# Patient Record
Sex: FEMALE | Race: ASIAN | NOT HISPANIC OR LATINO
[De-identification: names, ages, dates, MRNs, and addresses within clinical notes are randomized per-mention and may not be internally consistent; named-entity substitution may affect disease eponyms.]

---

## 2017-01-06 ENCOUNTER — APPOINTMENT (OUTPATIENT)
Dept: PEDIATRIC CARDIOLOGY | Facility: CLINIC | Age: 22
End: 2017-01-06

## 2017-02-02 ENCOUNTER — APPOINTMENT (OUTPATIENT)
Dept: ANTEPARTUM | Facility: CLINIC | Age: 22
End: 2017-02-02

## 2017-02-02 ENCOUNTER — ASOB RESULT (OUTPATIENT)
Age: 22
End: 2017-02-02

## 2017-03-23 ENCOUNTER — OUTPATIENT (OUTPATIENT)
Dept: OUTPATIENT SERVICES | Facility: HOSPITAL | Age: 22
LOS: 1 days | End: 2017-03-23
Payer: COMMERCIAL

## 2017-03-23 DIAGNOSIS — Z3A.00 WEEKS OF GESTATION OF PREGNANCY NOT SPECIFIED: ICD-10-CM

## 2017-03-23 DIAGNOSIS — O26.899 OTHER SPECIFIED PREGNANCY RELATED CONDITIONS, UNSPECIFIED TRIMESTER: ICD-10-CM

## 2017-03-23 PROCEDURE — 99213 OFFICE O/P EST LOW 20 MIN: CPT

## 2017-03-29 ENCOUNTER — OUTPATIENT (OUTPATIENT)
Dept: OUTPATIENT SERVICES | Facility: HOSPITAL | Age: 22
LOS: 1 days | End: 2017-03-29

## 2017-03-29 DIAGNOSIS — O26.899 OTHER SPECIFIED PREGNANCY RELATED CONDITIONS, UNSPECIFIED TRIMESTER: ICD-10-CM

## 2017-03-30 ENCOUNTER — INPATIENT (INPATIENT)
Facility: HOSPITAL | Age: 22
LOS: 1 days | Discharge: HOME | End: 2017-04-01
Attending: OBSTETRICS & GYNECOLOGY | Admitting: OBSTETRICS & GYNECOLOGY

## 2017-03-30 VITALS — WEIGHT: 152.12 LBS | HEIGHT: 58 IN

## 2017-03-30 DIAGNOSIS — Z3A.00 WEEKS OF GESTATION OF PREGNANCY NOT SPECIFIED: ICD-10-CM

## 2017-03-30 DIAGNOSIS — O26.899 OTHER SPECIFIED PREGNANCY RELATED CONDITIONS, UNSPECIFIED TRIMESTER: ICD-10-CM

## 2017-03-30 LAB
BASOPHILS # BLD AUTO: 0.02 K/UL — SIGNIFICANT CHANGE UP (ref 0–0.2)
BASOPHILS NFR BLD AUTO: 0.2 % — SIGNIFICANT CHANGE UP (ref 0–2)
BLD GP AB SCN SERPL QL: NEGATIVE — SIGNIFICANT CHANGE UP
EOSINOPHIL # BLD AUTO: 0.06 K/UL — SIGNIFICANT CHANGE UP (ref 0–0.5)
EOSINOPHIL NFR BLD AUTO: 0.5 % — SIGNIFICANT CHANGE UP (ref 0–6)
HCT VFR BLD CALC: 34.7 % — SIGNIFICANT CHANGE UP (ref 34.5–45)
HGB BLD-MCNC: 10 G/DL — LOW (ref 11.5–15.5)
IMM GRANULOCYTES NFR BLD AUTO: 0.3 % — SIGNIFICANT CHANGE UP (ref 0–1.5)
LYMPHOCYTES # BLD AUTO: 28.1 % — SIGNIFICANT CHANGE UP (ref 13–44)
LYMPHOCYTES # BLD AUTO: 3.37 K/UL — HIGH (ref 1–3.3)
MCHC RBC-ENTMCNC: 21.2 PG — LOW (ref 27–34)
MCHC RBC-ENTMCNC: 28.8 % — LOW (ref 32–36)
MCV RBC AUTO: 73.7 FL — LOW (ref 80–100)
MONOCYTES # BLD AUTO: 0.69 K/UL — SIGNIFICANT CHANGE UP (ref 0–0.9)
MONOCYTES NFR BLD AUTO: 5.8 % — SIGNIFICANT CHANGE UP (ref 2–14)
NEUTROPHILS # BLD AUTO: 7.8 K/UL — HIGH (ref 1.8–7.4)
NEUTROPHILS NFR BLD AUTO: 65.1 % — SIGNIFICANT CHANGE UP (ref 43–77)
PLATELET # BLD AUTO: 320 K/UL — SIGNIFICANT CHANGE UP (ref 150–400)
PMV BLD: 9.3 FL — SIGNIFICANT CHANGE UP (ref 7–13)
RBC # BLD: 4.71 M/UL — SIGNIFICANT CHANGE UP (ref 3.8–5.2)
RBC # FLD: SIGNIFICANT CHANGE UP % (ref 10.3–14.5)
RH IG SCN BLD-IMP: POSITIVE — SIGNIFICANT CHANGE UP
WBC # BLD: 11.98 K/UL — HIGH (ref 3.8–10.5)
WBC # FLD AUTO: 11.98 K/UL — HIGH (ref 3.8–10.5)

## 2017-03-30 RX ORDER — INFLUENZA VIRUS VACCINE 15; 15; 15; 15 UG/.5ML; UG/.5ML; UG/.5ML; UG/.5ML
0.5 SUSPENSION INTRAMUSCULAR ONCE
Qty: 0 | Refills: 0 | Status: DISCONTINUED | OUTPATIENT
Start: 2017-03-30 | End: 2017-04-01

## 2017-03-30 RX ORDER — DIBUCAINE 1 %
1 OINTMENT (GRAM) RECTAL EVERY 4 HOURS
Qty: 0 | Refills: 0 | Status: DISCONTINUED | OUTPATIENT
Start: 2017-03-30 | End: 2017-04-01

## 2017-03-30 RX ORDER — OXYTOCIN 10 UNIT/ML
41.67 VIAL (ML) INJECTION
Qty: 20 | Refills: 0 | Status: DISCONTINUED | OUTPATIENT
Start: 2017-03-30 | End: 2017-03-31

## 2017-03-30 RX ORDER — GLYCERIN ADULT
1 SUPPOSITORY, RECTAL RECTAL AT BEDTIME
Qty: 0 | Refills: 0 | Status: DISCONTINUED | OUTPATIENT
Start: 2017-03-30 | End: 2017-04-01

## 2017-03-30 RX ORDER — PRAMOXINE HYDROCHLORIDE 150 MG/15G
1 AEROSOL, FOAM RECTAL EVERY 4 HOURS
Qty: 0 | Refills: 0 | Status: DISCONTINUED | OUTPATIENT
Start: 2017-03-30 | End: 2017-03-30

## 2017-03-30 RX ORDER — HYDROCORTISONE 1 %
1 OINTMENT (GRAM) TOPICAL EVERY 4 HOURS
Qty: 0 | Refills: 0 | Status: DISCONTINUED | OUTPATIENT
Start: 2017-03-30 | End: 2017-03-31

## 2017-03-30 RX ORDER — DIBUCAINE 1 %
1 OINTMENT (GRAM) RECTAL EVERY 4 HOURS
Qty: 0 | Refills: 0 | Status: DISCONTINUED | OUTPATIENT
Start: 2017-03-30 | End: 2017-03-30

## 2017-03-30 RX ORDER — SODIUM CHLORIDE 9 MG/ML
3 INJECTION INTRAMUSCULAR; INTRAVENOUS; SUBCUTANEOUS EVERY 8 HOURS
Qty: 0 | Refills: 0 | Status: DISCONTINUED | OUTPATIENT
Start: 2017-03-30 | End: 2017-03-30

## 2017-03-30 RX ORDER — OXYTOCIN 10 UNIT/ML
333.33 VIAL (ML) INJECTION
Qty: 20 | Refills: 0 | Status: DISCONTINUED | OUTPATIENT
Start: 2017-03-30 | End: 2017-03-31

## 2017-03-30 RX ORDER — SODIUM CHLORIDE 9 MG/ML
3 INJECTION INTRAMUSCULAR; INTRAVENOUS; SUBCUTANEOUS EVERY 8 HOURS
Qty: 0 | Refills: 0 | Status: DISCONTINUED | OUTPATIENT
Start: 2017-03-30 | End: 2017-03-31

## 2017-03-30 RX ORDER — LANOLIN
1 OINTMENT (GRAM) TOPICAL EVERY 6 HOURS
Qty: 0 | Refills: 0 | Status: DISCONTINUED | OUTPATIENT
Start: 2017-03-30 | End: 2017-04-01

## 2017-03-30 RX ORDER — OXYCODONE HYDROCHLORIDE 5 MG/1
5 TABLET ORAL
Qty: 0 | Refills: 0 | Status: DISCONTINUED | OUTPATIENT
Start: 2017-03-30 | End: 2017-04-01

## 2017-03-30 RX ORDER — OXYTOCIN 10 UNIT/ML
333.33 VIAL (ML) INJECTION
Qty: 20 | Refills: 0 | Status: COMPLETED | OUTPATIENT
Start: 2017-03-30

## 2017-03-30 RX ORDER — SODIUM CHLORIDE 9 MG/ML
1000 INJECTION, SOLUTION INTRAVENOUS ONCE
Qty: 0 | Refills: 0 | Status: COMPLETED | OUTPATIENT
Start: 2017-03-30 | End: 2017-03-30

## 2017-03-30 RX ORDER — HYDROCORTISONE 1 %
1 OINTMENT (GRAM) TOPICAL EVERY 4 HOURS
Qty: 0 | Refills: 0 | Status: DISCONTINUED | OUTPATIENT
Start: 2017-03-30 | End: 2017-03-30

## 2017-03-30 RX ORDER — DOCUSATE SODIUM 100 MG
100 CAPSULE ORAL
Qty: 0 | Refills: 0 | Status: DISCONTINUED | OUTPATIENT
Start: 2017-03-30 | End: 2017-04-01

## 2017-03-30 RX ORDER — KETOROLAC TROMETHAMINE 30 MG/ML
30 SYRINGE (ML) INJECTION ONCE
Qty: 0 | Refills: 0 | Status: DISCONTINUED | OUTPATIENT
Start: 2017-03-30 | End: 2017-03-30

## 2017-03-30 RX ORDER — ACETAMINOPHEN 500 MG
975 TABLET ORAL EVERY 6 HOURS
Qty: 0 | Refills: 0 | Status: DISCONTINUED | OUTPATIENT
Start: 2017-03-30 | End: 2017-04-01

## 2017-03-30 RX ORDER — PRAMOXINE HYDROCHLORIDE 150 MG/15G
1 AEROSOL, FOAM RECTAL EVERY 4 HOURS
Qty: 0 | Refills: 0 | Status: DISCONTINUED | OUTPATIENT
Start: 2017-03-30 | End: 2017-03-31

## 2017-03-30 RX ORDER — AER TRAVELER 0.5 G/1
1 SOLUTION RECTAL; TOPICAL EVERY 4 HOURS
Qty: 0 | Refills: 0 | Status: DISCONTINUED | OUTPATIENT
Start: 2017-03-30 | End: 2017-04-01

## 2017-03-30 RX ORDER — OXYTOCIN 10 UNIT/ML
41.67 VIAL (ML) INJECTION
Qty: 20 | Refills: 0 | Status: DISCONTINUED | OUTPATIENT
Start: 2017-03-30 | End: 2017-03-30

## 2017-03-30 RX ORDER — MAGNESIUM HYDROXIDE 400 MG/1
30 TABLET, CHEWABLE ORAL
Qty: 0 | Refills: 0 | Status: DISCONTINUED | OUTPATIENT
Start: 2017-03-30 | End: 2017-04-01

## 2017-03-30 RX ORDER — DIPHENHYDRAMINE HCL 50 MG
25 CAPSULE ORAL EVERY 6 HOURS
Qty: 0 | Refills: 0 | Status: DISCONTINUED | OUTPATIENT
Start: 2017-03-30 | End: 2017-04-01

## 2017-03-30 RX ORDER — AER TRAVELER 0.5 G/1
1 SOLUTION RECTAL; TOPICAL EVERY 4 HOURS
Qty: 0 | Refills: 0 | Status: DISCONTINUED | OUTPATIENT
Start: 2017-03-30 | End: 2017-03-30

## 2017-03-30 RX ORDER — OXYCODONE HYDROCHLORIDE 5 MG/1
5 TABLET ORAL EVERY 4 HOURS
Qty: 0 | Refills: 0 | Status: DISCONTINUED | OUTPATIENT
Start: 2017-03-30 | End: 2017-04-01

## 2017-03-30 RX ORDER — IBUPROFEN 200 MG
600 TABLET ORAL EVERY 6 HOURS
Qty: 0 | Refills: 0 | Status: DISCONTINUED | OUTPATIENT
Start: 2017-03-30 | End: 2017-04-01

## 2017-03-30 RX ORDER — TETANUS TOXOID, REDUCED DIPHTHERIA TOXOID AND ACELLULAR PERTUSSIS VACCINE, ADSORBED 5; 2.5; 8; 8; 2.5 [IU]/.5ML; [IU]/.5ML; UG/.5ML; UG/.5ML; UG/.5ML
0.5 SUSPENSION INTRAMUSCULAR ONCE
Qty: 0 | Refills: 0 | Status: COMPLETED | OUTPATIENT
Start: 2017-03-30

## 2017-03-30 RX ORDER — SODIUM CHLORIDE 9 MG/ML
1000 INJECTION, SOLUTION INTRAVENOUS
Qty: 0 | Refills: 0 | Status: DISCONTINUED | OUTPATIENT
Start: 2017-03-30 | End: 2017-03-30

## 2017-03-30 RX ORDER — SIMETHICONE 80 MG/1
80 TABLET, CHEWABLE ORAL EVERY 6 HOURS
Qty: 0 | Refills: 0 | Status: DISCONTINUED | OUTPATIENT
Start: 2017-03-30 | End: 2017-04-01

## 2017-03-30 RX ADMIN — Medication 30 MILLIGRAM(S): at 20:15

## 2017-03-30 RX ADMIN — SODIUM CHLORIDE 3 MILLILITER(S): 9 INJECTION INTRAMUSCULAR; INTRAVENOUS; SUBCUTANEOUS at 23:00

## 2017-03-30 RX ADMIN — SODIUM CHLORIDE 2000 MILLILITER(S): 9 INJECTION, SOLUTION INTRAVENOUS at 16:50

## 2017-03-30 RX ADMIN — Medication 1 APPLICATION(S): at 23:23

## 2017-03-30 RX ADMIN — Medication 30 MILLIGRAM(S): at 20:30

## 2017-03-30 RX ADMIN — Medication 125 MILLIUNIT(S)/MIN: at 20:17

## 2017-03-30 RX ADMIN — PRAMOXINE HYDROCHLORIDE 1 APPLICATION(S): 150 AEROSOL, FOAM RECTAL at 23:23

## 2017-03-30 RX ADMIN — Medication 1000 MILLIUNIT(S)/MIN: at 20:46

## 2017-03-30 RX ADMIN — AER TRAVELER 1 APPLICATION(S): 0.5 SOLUTION RECTAL; TOPICAL at 23:22

## 2017-03-31 ENCOUNTER — TRANSCRIPTION ENCOUNTER (OUTPATIENT)
Age: 22
End: 2017-03-31

## 2017-03-31 LAB — T PALLIDUM AB TITR SER: NEGATIVE — SIGNIFICANT CHANGE UP

## 2017-03-31 RX ORDER — PRAMOXINE HYDROCHLORIDE 150 MG/15G
1 AEROSOL, FOAM RECTAL EVERY 4 HOURS
Qty: 0 | Refills: 0 | Status: DISCONTINUED | OUTPATIENT
Start: 2017-03-31 | End: 2017-04-01

## 2017-03-31 RX ORDER — HYDROCORTISONE 1 %
1 OINTMENT (GRAM) TOPICAL EVERY 4 HOURS
Qty: 0 | Refills: 0 | Status: DISCONTINUED | OUTPATIENT
Start: 2017-03-31 | End: 2017-04-01

## 2017-03-31 RX ORDER — TETANUS TOXOID, REDUCED DIPHTHERIA TOXOID AND ACELLULAR PERTUSSIS VACCINE, ADSORBED 5; 2.5; 8; 8; 2.5 [IU]/.5ML; [IU]/.5ML; UG/.5ML; UG/.5ML; UG/.5ML
0.5 SUSPENSION INTRAMUSCULAR ONCE
Qty: 0 | Refills: 0 | Status: COMPLETED | OUTPATIENT
Start: 2017-03-31 | End: 2017-03-31

## 2017-03-31 RX ADMIN — Medication 600 MILLIGRAM(S): at 05:53

## 2017-03-31 RX ADMIN — Medication 600 MILLIGRAM(S): at 17:17

## 2017-03-31 RX ADMIN — TETANUS TOXOID, REDUCED DIPHTHERIA TOXOID AND ACELLULAR PERTUSSIS VACCINE, ADSORBED 0.5 MILLILITER(S): 5; 2.5; 8; 8; 2.5 SUSPENSION INTRAMUSCULAR at 22:40

## 2017-03-31 RX ADMIN — OXYCODONE HYDROCHLORIDE 5 MILLIGRAM(S): 5 TABLET ORAL at 22:42

## 2017-03-31 RX ADMIN — OXYCODONE HYDROCHLORIDE 5 MILLIGRAM(S): 5 TABLET ORAL at 17:17

## 2017-03-31 RX ADMIN — Medication 975 MILLIGRAM(S): at 12:19

## 2017-03-31 RX ADMIN — OXYCODONE HYDROCHLORIDE 5 MILLIGRAM(S): 5 TABLET ORAL at 23:15

## 2017-03-31 RX ADMIN — Medication 600 MILLIGRAM(S): at 23:15

## 2017-03-31 RX ADMIN — Medication 600 MILLIGRAM(S): at 12:19

## 2017-03-31 RX ADMIN — Medication 975 MILLIGRAM(S): at 18:00

## 2017-03-31 RX ADMIN — Medication 1 TABLET(S): at 12:19

## 2017-03-31 RX ADMIN — Medication 975 MILLIGRAM(S): at 23:15

## 2017-03-31 RX ADMIN — Medication 975 MILLIGRAM(S): at 05:00

## 2017-03-31 RX ADMIN — Medication 975 MILLIGRAM(S): at 22:42

## 2017-03-31 RX ADMIN — Medication 600 MILLIGRAM(S): at 06:16

## 2017-03-31 RX ADMIN — PRAMOXINE HYDROCHLORIDE 1 APPLICATION(S): 150 AEROSOL, FOAM RECTAL at 22:43

## 2017-03-31 RX ADMIN — Medication 975 MILLIGRAM(S): at 17:18

## 2017-03-31 RX ADMIN — Medication 600 MILLIGRAM(S): at 22:42

## 2017-03-31 RX ADMIN — Medication 1 APPLICATION(S): at 22:43

## 2017-03-31 RX ADMIN — AER TRAVELER 1 APPLICATION(S): 0.5 SOLUTION RECTAL; TOPICAL at 22:42

## 2017-03-31 RX ADMIN — Medication 600 MILLIGRAM(S): at 18:00

## 2017-03-31 RX ADMIN — Medication 600 MILLIGRAM(S): at 12:49

## 2017-03-31 RX ADMIN — OXYCODONE HYDROCHLORIDE 5 MILLIGRAM(S): 5 TABLET ORAL at 18:00

## 2017-03-31 RX ADMIN — Medication 975 MILLIGRAM(S): at 04:02

## 2017-03-31 RX ADMIN — OXYCODONE HYDROCHLORIDE 5 MILLIGRAM(S): 5 TABLET ORAL at 08:53

## 2017-03-31 RX ADMIN — Medication 100 MILLIGRAM(S): at 05:53

## 2017-03-31 RX ADMIN — Medication 975 MILLIGRAM(S): at 12:49

## 2017-03-31 RX ADMIN — OXYCODONE HYDROCHLORIDE 5 MILLIGRAM(S): 5 TABLET ORAL at 09:30

## 2017-03-31 RX ADMIN — SODIUM CHLORIDE 3 MILLILITER(S): 9 INJECTION INTRAMUSCULAR; INTRAVENOUS; SUBCUTANEOUS at 05:52

## 2017-03-31 RX ADMIN — Medication 1 APPLICATION(S): at 22:42

## 2017-03-31 NOTE — DISCHARGE NOTE OB - CARE PROVIDER_API CALL
Ani Mccann  [address] [place] [address]  [facility] [address]  Phone: [phone]  Fax: [phone]

## 2017-03-31 NOTE — DISCHARGE NOTE OB - CARE PLAN
Principal Discharge DX:	Vaginal delivery  Goal:	wellness  Instructions for follow-up, activity and diet:	Same as the last day in the hospital.

## 2017-03-31 NOTE — DISCHARGE NOTE OB - MEDICATION SUMMARY - MEDICATIONS TO STOP TAKING
I will STOP taking the medications listed below when I get home from the hospital:    Prenatal 1 oral capsule  --  by mouth

## 2017-03-31 NOTE — DISCHARGE NOTE OB - HOSPITAL COURSE
Patient presented in active labor and received an epidural .She progressed rapidly to full dilatation . Viable female infant. vacuum assistance for terminal bradycardia and maternal exhaustion.

## 2017-03-31 NOTE — DISCHARGE NOTE OB - PROVIDER TOKENS
FREE:[LAST:[Ramy],FIRST:[Ani],PHONE:[phone]],FAX:[phone]],ADDRESS:[[address] [place] [address]]]

## 2017-04-01 VITALS
DIASTOLIC BLOOD PRESSURE: 60 MMHG | TEMPERATURE: 98 F | OXYGEN SATURATION: 100 % | SYSTOLIC BLOOD PRESSURE: 112 MMHG | HEART RATE: 64 BPM | RESPIRATION RATE: 18 BRPM

## 2017-04-01 RX ADMIN — OXYCODONE HYDROCHLORIDE 5 MILLIGRAM(S): 5 TABLET ORAL at 06:32

## 2017-04-01 RX ADMIN — Medication 975 MILLIGRAM(S): at 05:55

## 2017-04-01 RX ADMIN — OXYCODONE HYDROCHLORIDE 5 MILLIGRAM(S): 5 TABLET ORAL at 05:55

## 2017-04-01 RX ADMIN — Medication 975 MILLIGRAM(S): at 06:32

## 2017-04-01 RX ADMIN — Medication 600 MILLIGRAM(S): at 06:32

## 2017-04-01 RX ADMIN — Medication 600 MILLIGRAM(S): at 05:55

## 2017-11-18 ENCOUNTER — EMERGENCY (EMERGENCY)
Facility: HOSPITAL | Age: 22
LOS: 1 days | Discharge: HOME | End: 2017-11-18
Attending: EMERGENCY MEDICINE | Admitting: EMERGENCY MEDICINE
Payer: COMMERCIAL

## 2017-11-18 VITALS
RESPIRATION RATE: 18 BRPM | WEIGHT: 136.03 LBS | SYSTOLIC BLOOD PRESSURE: 135 MMHG | OXYGEN SATURATION: 99 % | DIASTOLIC BLOOD PRESSURE: 92 MMHG | TEMPERATURE: 98 F | HEIGHT: 58 IN | HEART RATE: 88 BPM

## 2017-11-18 LAB
ALBUMIN SERPL ELPH-MCNC: 4.1 G/DL — SIGNIFICANT CHANGE UP (ref 3.3–5)
ALP SERPL-CCNC: 70 U/L — SIGNIFICANT CHANGE UP (ref 40–120)
ALT FLD-CCNC: 15 U/L — SIGNIFICANT CHANGE UP (ref 4–33)
APPEARANCE UR: CLEAR — SIGNIFICANT CHANGE UP
APTT BLD: 29.8 SEC — SIGNIFICANT CHANGE UP (ref 27.5–37.4)
AST SERPL-CCNC: 22 U/L — SIGNIFICANT CHANGE UP (ref 4–32)
BASOPHILS # BLD AUTO: 0.06 K/UL — SIGNIFICANT CHANGE UP (ref 0–0.2)
BASOPHILS NFR BLD AUTO: 0.4 % — SIGNIFICANT CHANGE UP (ref 0–2)
BILIRUB SERPL-MCNC: 0.4 MG/DL — SIGNIFICANT CHANGE UP (ref 0.2–1.2)
BILIRUB UR-MCNC: NEGATIVE — SIGNIFICANT CHANGE UP
BLOOD UR QL VISUAL: NEGATIVE — SIGNIFICANT CHANGE UP
BUN SERPL-MCNC: 11 MG/DL — SIGNIFICANT CHANGE UP (ref 7–23)
CALCIUM SERPL-MCNC: 9.3 MG/DL — SIGNIFICANT CHANGE UP (ref 8.4–10.5)
CHLORIDE SERPL-SCNC: 102 MMOL/L — SIGNIFICANT CHANGE UP (ref 98–107)
CO2 SERPL-SCNC: 20 MMOL/L — LOW (ref 22–31)
COLOR SPEC: SIGNIFICANT CHANGE UP
CREAT SERPL-MCNC: 0.58 MG/DL — SIGNIFICANT CHANGE UP (ref 0.5–1.3)
EOSINOPHIL # BLD AUTO: 0.17 K/UL — SIGNIFICANT CHANGE UP (ref 0–0.5)
EOSINOPHIL NFR BLD AUTO: 1.1 % — SIGNIFICANT CHANGE UP (ref 0–6)
GLUCOSE SERPL-MCNC: 92 MG/DL — SIGNIFICANT CHANGE UP (ref 70–99)
GLUCOSE UR-MCNC: NEGATIVE — SIGNIFICANT CHANGE UP
HCG SERPL-ACNC: SIGNIFICANT CHANGE UP MIU/ML
HCT VFR BLD CALC: 39 % — SIGNIFICANT CHANGE UP (ref 34.5–45)
HGB BLD-MCNC: 12.7 G/DL — SIGNIFICANT CHANGE UP (ref 11.5–15.5)
IMM GRANULOCYTES # BLD AUTO: 0.05 # — SIGNIFICANT CHANGE UP
IMM GRANULOCYTES NFR BLD AUTO: 0.3 % — SIGNIFICANT CHANGE UP (ref 0–1.5)
INR BLD: 1.1 — SIGNIFICANT CHANGE UP (ref 0.88–1.17)
KETONES UR-MCNC: NEGATIVE — SIGNIFICANT CHANGE UP
LEUKOCYTE ESTERASE UR-ACNC: NEGATIVE — SIGNIFICANT CHANGE UP
LYMPHOCYTES # BLD AUTO: 35.2 % — SIGNIFICANT CHANGE UP (ref 13–44)
LYMPHOCYTES # BLD AUTO: 5.22 K/UL — HIGH (ref 1–3.3)
MCHC RBC-ENTMCNC: 25.7 PG — LOW (ref 27–34)
MCHC RBC-ENTMCNC: 32.6 % — SIGNIFICANT CHANGE UP (ref 32–36)
MCV RBC AUTO: 78.8 FL — LOW (ref 80–100)
MONOCYTES # BLD AUTO: 0.78 K/UL — SIGNIFICANT CHANGE UP (ref 0–0.9)
MONOCYTES NFR BLD AUTO: 5.3 % — SIGNIFICANT CHANGE UP (ref 2–14)
MUCOUS THREADS # UR AUTO: SIGNIFICANT CHANGE UP
NEUTROPHILS # BLD AUTO: 8.57 K/UL — HIGH (ref 1.8–7.4)
NEUTROPHILS NFR BLD AUTO: 57.7 % — SIGNIFICANT CHANGE UP (ref 43–77)
NITRITE UR-MCNC: NEGATIVE — SIGNIFICANT CHANGE UP
NRBC # FLD: 0 — SIGNIFICANT CHANGE UP
PH UR: 6 — SIGNIFICANT CHANGE UP (ref 4.6–8)
PLATELET # BLD AUTO: 374 K/UL — SIGNIFICANT CHANGE UP (ref 150–400)
PMV BLD: 9.4 FL — SIGNIFICANT CHANGE UP (ref 7–13)
POTASSIUM SERPL-MCNC: 4.2 MMOL/L — SIGNIFICANT CHANGE UP (ref 3.5–5.3)
POTASSIUM SERPL-SCNC: 4.2 MMOL/L — SIGNIFICANT CHANGE UP (ref 3.5–5.3)
PROT SERPL-MCNC: 7.6 G/DL — SIGNIFICANT CHANGE UP (ref 6–8.3)
PROT UR-MCNC: 10 — SIGNIFICANT CHANGE UP
PROTHROM AB SERPL-ACNC: 12.3 SEC — SIGNIFICANT CHANGE UP (ref 9.8–13.1)
RBC # BLD: 4.95 M/UL — SIGNIFICANT CHANGE UP (ref 3.8–5.2)
RBC # FLD: 13.7 % — SIGNIFICANT CHANGE UP (ref 10.3–14.5)
RBC CASTS # UR COMP ASSIST: SIGNIFICANT CHANGE UP (ref 0–?)
REVIEW TO FOLLOW: YES — SIGNIFICANT CHANGE UP
SODIUM SERPL-SCNC: 138 MMOL/L — SIGNIFICANT CHANGE UP (ref 135–145)
SP GR SPEC: 1.02 — SIGNIFICANT CHANGE UP (ref 1–1.03)
SQUAMOUS # UR AUTO: SIGNIFICANT CHANGE UP
UROBILINOGEN FLD QL: NORMAL E.U. — SIGNIFICANT CHANGE UP (ref 0.1–0.2)
WBC # BLD: 14.85 K/UL — HIGH (ref 3.8–10.5)
WBC # FLD AUTO: 14.85 K/UL — HIGH (ref 3.8–10.5)
WBC UR QL: SIGNIFICANT CHANGE UP (ref 0–?)

## 2017-11-18 PROCEDURE — 99285 EMERGENCY DEPT VISIT HI MDM: CPT

## 2017-11-18 PROCEDURE — 76830 TRANSVAGINAL US NON-OB: CPT | Mod: 26

## 2017-11-18 NOTE — ED ADULT NURSE NOTE - OBJECTIVE STATEMENT
justin RN - pt received to room 18, AAOx3, c/o suprapubic pain, and vaginal bleeding since Wednesday. began with spotting, currently heavier per pt. changed 2 pads throughout the day today. pt had a positive home pregnancy test on Saturday. . pt has a scheduled GYN appt next week. pt denies any nausea/vomiting/urinary complaints. appears comfortable. VS as noted, pt in NAD, 18G IV placed to L AC. labs/urine sent, report given to primary RN Vani.

## 2017-11-18 NOTE — ED PROVIDER NOTE - PROGRESS NOTE DETAILS
Klepfish: +IUP, rh+, bleeding resolved, no abd pain. benign abd. Given copy of results, comfortable for dc, has outpt ob appt.

## 2017-11-18 NOTE — ED PROVIDER NOTE - ATTENDING CONTRIBUTION TO CARE
22F  LMP  p/w vaginal spotting, intermittent x1w, heavier bleeding x2d w/ clots. Also intermittent b/l lower abd cramping, none currently. Vitals and exam wnl.  ddx: Eval IUP  CBC, cmp, hcg, T and S. UA, TVUS. Reassess.

## 2017-11-18 NOTE — ED PROVIDER NOTE - OBJECTIVE STATEMENT
21yo F no sig PMHx p/w CC vaginal bleeding. Pt. states that last week her nephew jumped on her and since then she has been spotting, she reports that for the past 2 days bleeding has increased, she is passing some clots and has used to liners today, also reports some cramping. States she took a pregnancy test 2 weeks ago which was positive. LMP 9/27/17. Has not seen her OB. Denies fever, chills, CP, SOB, N/V/D, urinary symptoms. 21yo F no sig PMHx p/w CC vaginal bleeding. Pt. states that last week her nephew jumped on her and since then she has been spotting, she reports that for the past 2 days bleeding has increased, she is passing some clots and has used to liners today, also reports some cramping. States she took a pregnancy test 2 weeks ago which was positive. LMP 17. Has not seen her OB. Denies fever, chills, CP, SOB, N/V/D, urinary symptoms.  Klepfish: 22F  LMP  p/w vaginal spotting, intermittent x1w, heavier bleeding x2d w/ clots. bleeding now improving. Also intermittent b/l lower abd cramping, none currently. Denies lightheaded, SOB/CP, NVD, urinary complaints, black/bloody stool, bleeding from elsewhere. Not on AC. +home preg test.

## 2017-11-18 NOTE — ED PROVIDER NOTE - MEDICAL DECISION MAKING DETAILS
23yo F no sig PMHx p/w CC vaginal bleeding - will send cbc cmp type+screen ua hcg transvaginal US and reassess.

## 2017-11-18 NOTE — ED ADULT TRIAGE NOTE - CHIEF COMPLAINT QUOTE
PREGNANT/VAG BLEED    had positive home pregnancy test 2 weeks ago and has ob appt on 11/20.  pt states her nephew jumped on her last week and she started spotting on wednesday.... bleeding has increased...used 2 panty liners today... passing clots also. c/o lower abd pain

## 2017-11-19 VITALS
DIASTOLIC BLOOD PRESSURE: 68 MMHG | RESPIRATION RATE: 16 BRPM | HEART RATE: 86 BPM | OXYGEN SATURATION: 99 % | SYSTOLIC BLOOD PRESSURE: 109 MMHG

## 2017-11-19 LAB
BLD GP AB SCN SERPL QL: NEGATIVE — SIGNIFICANT CHANGE UP
RH IG SCN BLD-IMP: POSITIVE — SIGNIFICANT CHANGE UP

## 2018-01-11 ENCOUNTER — APPOINTMENT (OUTPATIENT)
Dept: OBGYN | Facility: CLINIC | Age: 23
End: 2018-01-11
Payer: COMMERCIAL

## 2018-01-11 ENCOUNTER — ASOB RESULT (OUTPATIENT)
Age: 23
End: 2018-01-11

## 2018-01-11 PROCEDURE — 76815 OB US LIMITED FETUS(S): CPT

## 2018-02-15 ENCOUNTER — APPOINTMENT (OUTPATIENT)
Dept: OBGYN | Facility: CLINIC | Age: 23
End: 2018-02-15
Payer: COMMERCIAL

## 2018-02-15 ENCOUNTER — ASOB RESULT (OUTPATIENT)
Age: 23
End: 2018-02-15

## 2018-02-15 PROCEDURE — 76805 OB US >/= 14 WKS SNGL FETUS: CPT

## 2018-03-19 ENCOUNTER — EMERGENCY (EMERGENCY)
Facility: HOSPITAL | Age: 23
LOS: 1 days | Discharge: HOME | End: 2018-03-19
Attending: EMERGENCY MEDICINE | Admitting: EMERGENCY MEDICINE
Payer: COMMERCIAL

## 2018-03-19 VITALS
HEART RATE: 93 BPM | DIASTOLIC BLOOD PRESSURE: 57 MMHG | RESPIRATION RATE: 16 BRPM | TEMPERATURE: 98 F | OXYGEN SATURATION: 100 % | SYSTOLIC BLOOD PRESSURE: 110 MMHG

## 2018-03-19 LAB
ALBUMIN SERPL ELPH-MCNC: 3.1 G/DL — LOW (ref 3.3–5)
ALP SERPL-CCNC: 58 U/L — SIGNIFICANT CHANGE UP (ref 40–120)
ALT FLD-CCNC: 9 U/L — SIGNIFICANT CHANGE UP (ref 4–33)
APPEARANCE UR: CLEAR — SIGNIFICANT CHANGE UP
AST SERPL-CCNC: 18 U/L — SIGNIFICANT CHANGE UP (ref 4–32)
BILIRUB SERPL-MCNC: 0.3 MG/DL — SIGNIFICANT CHANGE UP (ref 0.2–1.2)
BILIRUB UR-MCNC: NEGATIVE — SIGNIFICANT CHANGE UP
BLOOD UR QL VISUAL: NEGATIVE — SIGNIFICANT CHANGE UP
BUN SERPL-MCNC: 7 MG/DL — SIGNIFICANT CHANGE UP (ref 7–23)
CALCIUM SERPL-MCNC: 8.2 MG/DL — LOW (ref 8.4–10.5)
CHLORIDE SERPL-SCNC: 102 MMOL/L — SIGNIFICANT CHANGE UP (ref 98–107)
CO2 SERPL-SCNC: 24 MMOL/L — SIGNIFICANT CHANGE UP (ref 22–31)
COLOR SPEC: SIGNIFICANT CHANGE UP
CREAT SERPL-MCNC: 0.43 MG/DL — LOW (ref 0.5–1.3)
GLUCOSE SERPL-MCNC: 84 MG/DL — SIGNIFICANT CHANGE UP (ref 70–99)
GLUCOSE UR-MCNC: NEGATIVE — SIGNIFICANT CHANGE UP
HCT VFR BLD CALC: 34.6 % — SIGNIFICANT CHANGE UP (ref 34.5–45)
HGB BLD-MCNC: 10.7 G/DL — LOW (ref 11.5–15.5)
KETONES UR-MCNC: NEGATIVE — SIGNIFICANT CHANGE UP
LEUKOCYTE ESTERASE UR-ACNC: NEGATIVE — SIGNIFICANT CHANGE UP
MCHC RBC-ENTMCNC: 24.2 PG — LOW (ref 27–34)
MCHC RBC-ENTMCNC: 30.9 % — LOW (ref 32–36)
MCV RBC AUTO: 78.1 FL — LOW (ref 80–100)
MUCOUS THREADS # UR AUTO: SIGNIFICANT CHANGE UP
NITRITE UR-MCNC: NEGATIVE — SIGNIFICANT CHANGE UP
NON-SQ EPI CELLS # UR AUTO: <1 — SIGNIFICANT CHANGE UP
NRBC # FLD: 0 — SIGNIFICANT CHANGE UP
PH UR: 7 — SIGNIFICANT CHANGE UP (ref 4.6–8)
PLATELET # BLD AUTO: 330 K/UL — SIGNIFICANT CHANGE UP (ref 150–400)
PMV BLD: 9.6 FL — SIGNIFICANT CHANGE UP (ref 7–13)
POTASSIUM SERPL-MCNC: 4.2 MMOL/L — SIGNIFICANT CHANGE UP (ref 3.5–5.3)
POTASSIUM SERPL-SCNC: 4.2 MMOL/L — SIGNIFICANT CHANGE UP (ref 3.5–5.3)
PROT SERPL-MCNC: 6.4 G/DL — SIGNIFICANT CHANGE UP (ref 6–8.3)
PROT UR-MCNC: NEGATIVE MG/DL — SIGNIFICANT CHANGE UP
RBC # BLD: 4.43 M/UL — SIGNIFICANT CHANGE UP (ref 3.8–5.2)
RBC # FLD: 14.3 % — SIGNIFICANT CHANGE UP (ref 10.3–14.5)
RBC CASTS # UR COMP ASSIST: HIGH (ref 0–?)
SODIUM SERPL-SCNC: 137 MMOL/L — SIGNIFICANT CHANGE UP (ref 135–145)
SP GR SPEC: 1.01 — SIGNIFICANT CHANGE UP (ref 1–1.04)
SQUAMOUS # UR AUTO: SIGNIFICANT CHANGE UP
UROBILINOGEN FLD QL: NORMAL MG/DL — SIGNIFICANT CHANGE UP
WBC # BLD: 14.26 K/UL — HIGH (ref 3.8–10.5)
WBC # FLD AUTO: 14.26 K/UL — HIGH (ref 3.8–10.5)
WBC UR QL: SIGNIFICANT CHANGE UP (ref 0–?)

## 2018-03-19 PROCEDURE — 99284 EMERGENCY DEPT VISIT MOD MDM: CPT

## 2018-03-19 RX ORDER — MECLIZINE HCL 12.5 MG
25 TABLET ORAL ONCE
Qty: 0 | Refills: 0 | Status: COMPLETED | OUTPATIENT
Start: 2018-03-19 | End: 2018-03-19

## 2018-03-19 RX ORDER — SODIUM CHLORIDE 9 MG/ML
1000 INJECTION INTRAMUSCULAR; INTRAVENOUS; SUBCUTANEOUS ONCE
Qty: 0 | Refills: 0 | Status: COMPLETED | OUTPATIENT
Start: 2018-03-19 | End: 2018-03-19

## 2018-03-19 RX ORDER — MECLIZINE HCL 12.5 MG
1 TABLET ORAL
Qty: 15 | Refills: 0 | OUTPATIENT
Start: 2018-03-19 | End: 2018-03-23

## 2018-03-19 RX ADMIN — Medication 25 MILLIGRAM(S): at 21:35

## 2018-03-19 RX ADMIN — SODIUM CHLORIDE 1000 MILLILITER(S): 9 INJECTION INTRAMUSCULAR; INTRAVENOUS; SUBCUTANEOUS at 19:34

## 2018-03-19 NOTE — ED PROVIDER NOTE - ENMT, MLM
Airway patent, Nasal mucosa clear. Mouth with normal mucosa. Throat has no vesicles, no oropharyngeal exudates and uvula is midline. TM intact b/l, no erythema, no effusion, no signs of infection. No temporal tenderness.

## 2018-03-19 NOTE — ED PROVIDER NOTE - ATTENDING CONTRIBUTION TO CARE
Attending Note (Sara): patient complaining of dizziness.  resolves at rest. denies chest pain, changes in hearing, weight loss, emesis, recent uri like symptoms. normal neuro exam.  very low suspicion preeclampsia.  will check ua and labs.  if no acute pathology, discharge.

## 2018-03-19 NOTE — ED PROVIDER NOTE - CHPI ED SYMPTOMS NEG
no confusion/no vomiting/no numbness/no weakness/no loss of consciousness/no fever/no nausea/no change in level of consciousness

## 2018-03-19 NOTE — ED PROVIDER NOTE - MEDICAL DECISION MAKING DETAILS
21 yo F, , currently 24 weeks pregnant, LMP 18, Due date 18, with no significant PMH, advised by her GYN, Dr. Mccann to present to the ER for eval of worsening dizziness x4 days associate with blurry vision, no nausea or vomiting.   -young healthy female, no PMHx, currently 24 weeks pregnant, p/w intermittent dizziness, spinning, likely peripheral vertigo, no red flags. Plan: IVF, labs, meclizine and reassess.

## 2018-03-19 NOTE — ED PROVIDER NOTE - CARE PLAN
Principal Discharge DX:	Dizziness  Assessment and plan of treatment:	Rest, drink plenty of fluids.  Advance activity as tolerated. Take Meclizine 25 mg three times a day as needed for dizziness -- may cause drowsiness; DO NOT DRINK ALCOHOL, DRIVE, OR OPERATE HEAVY MACHINERY while taking this medication.  Follow up with your primary care physician and OBGYN in 48-72 hours- bring copies of your results.  Return to the ER for worsening or persistent symptoms, and/or ANY NEW OR CONCERNING SYMPTOMS. If you have issues obtaining follow up, please call: [phone] to obtain a doctor or specialist who takes your insurance in your area.

## 2018-03-19 NOTE — ED PROVIDER NOTE - PLAN OF CARE
Rest, drink plenty of fluids.  Advance activity as tolerated. Take Meclizine 25 mg three times a day as needed for dizziness -- may cause drowsiness; DO NOT DRINK ALCOHOL, DRIVE, OR OPERATE HEAVY MACHINERY while taking this medication.  Follow up with your primary care physician and OBGYN in 48-72 hours- bring copies of your results.  Return to the ER for worsening or persistent symptoms, and/or ANY NEW OR CONCERNING SYMPTOMS. If you have issues obtaining follow up, please call: [phone] to obtain a doctor or specialist who takes your insurance in your area.

## 2018-03-19 NOTE — ED ADULT TRIAGE NOTE - CHIEF COMPLAINT QUOTE
Pt states that she has been having dizziness everyday for the last 4 days which blurred vision occurs as well. Denies any n.v

## 2018-03-19 NOTE — ED PROVIDER NOTE - OBJECTIVE STATEMENT
21 yo F, , currently 24 weeks pregnant, LMP 18, Due date 18, with no significant PMH, advised by her GYN, Dr. Mccann to present to the ER for eval of worsening dizziness x4 days associate with blurry vision, no nausea or vomiting. Pt states having dizziness, spinning in nature, intermittently, 4-5 episodes a day, lasting from min to 2 hrs, worse with head movement, occurs any rest or anytime of the day. Reports having blurry vision, "fuzzy" with the spinning sensation, but resolved when dizziness goes away. Admits having some off-balance with ambulation, no fall. States having hx of similar dizziness with previous pregnancy but no blurry vision. Pt currently not having any symptoms. Denies any fever, chills, headache, n/v/d/c, neck stiffness, vision loss, jaw claudication, chest pain, sob, abdominal pain, pelvic pain, vaginal bleeding, dysuria, hematuria, head injury, hearing loss, tinnitus, flu-like symptoms, recent travel, sick contact, or any other complaints.

## 2018-03-19 NOTE — ED PROVIDER NOTE - PROGRESS NOTE DETAILS
PA KENNY: patient states feeling better after Meclizine. No n/v, no other complaints. UA neg, results discussed with attending, can dispo patient home to f/u with PCP and GYN. The patient was given verbal and written discharge instructions. Specifically, instructions when to return to the ED and when to seek follow-up from their pcp was discussed. Any specialty follow-up was discussed, including how to make an appointment.  Instructions were discussed in simple, plain language and was understood by the patient. The patient understands that should their symptoms worsen or any new symptoms arise, they should return to the ED immediately for further evaluation. All pt's questions were answered. Patient verbalizes understanding.

## 2018-03-21 LAB
BACTERIA UR CULT: SIGNIFICANT CHANGE UP
SPECIMEN SOURCE: SIGNIFICANT CHANGE UP

## 2018-03-22 NOTE — ED POST DISCHARGE NOTE - OTHER COMMUNICATION
[phone] - left message; [phone] - left message with .   As per chart, OBGYN is Dr. Mccann.  No contact number listed on chart.  Admin PA try contacting pt again.

## 2018-06-28 ENCOUNTER — INPATIENT (INPATIENT)
Facility: HOSPITAL | Age: 23
LOS: 2 days | Discharge: HOME | End: 2018-07-01
Attending: OBSTETRICS & GYNECOLOGY | Admitting: OBSTETRICS & GYNECOLOGY

## 2018-06-28 VITALS — WEIGHT: 149.91 LBS | HEIGHT: 59 IN

## 2018-06-28 DIAGNOSIS — O26.899 OTHER SPECIFIED PREGNANCY RELATED CONDITIONS, UNSPECIFIED TRIMESTER: ICD-10-CM

## 2018-06-28 DIAGNOSIS — Z3A.00 WEEKS OF GESTATION OF PREGNANCY NOT SPECIFIED: ICD-10-CM

## 2018-06-28 LAB
BASOPHILS # BLD AUTO: 0.05 K/UL — SIGNIFICANT CHANGE UP (ref 0–0.2)
BASOPHILS NFR BLD AUTO: 0.4 % — SIGNIFICANT CHANGE UP (ref 0–2)
BLD GP AB SCN SERPL QL: NEGATIVE — SIGNIFICANT CHANGE UP
EOSINOPHIL # BLD AUTO: 0.13 K/UL — SIGNIFICANT CHANGE UP (ref 0–0.5)
EOSINOPHIL NFR BLD AUTO: 1.1 % — SIGNIFICANT CHANGE UP (ref 0–6)
HBV SURFACE AG SER-ACNC: NEGATIVE — SIGNIFICANT CHANGE UP
HCT VFR BLD CALC: 33.8 % — LOW (ref 34.5–45)
HGB BLD-MCNC: 9.8 G/DL — LOW (ref 11.5–15.5)
HIV COMBO RESULT: SIGNIFICANT CHANGE UP
HIV1+2 AB SPEC QL: SIGNIFICANT CHANGE UP
IMM GRANULOCYTES # BLD AUTO: 0.17 # — SIGNIFICANT CHANGE UP
IMM GRANULOCYTES NFR BLD AUTO: 1.4 % — SIGNIFICANT CHANGE UP (ref 0–1.5)
LYMPHOCYTES # BLD AUTO: 27.7 % — SIGNIFICANT CHANGE UP (ref 13–44)
LYMPHOCYTES # BLD AUTO: 3.38 K/UL — HIGH (ref 1–3.3)
MCHC RBC-ENTMCNC: 19.1 PG — LOW (ref 27–34)
MCHC RBC-ENTMCNC: 29 % — LOW (ref 32–36)
MCV RBC AUTO: 66 FL — LOW (ref 80–100)
MONOCYTES # BLD AUTO: 0.8 K/UL — SIGNIFICANT CHANGE UP (ref 0–0.9)
MONOCYTES NFR BLD AUTO: 6.6 % — SIGNIFICANT CHANGE UP (ref 2–14)
NEUTROPHILS # BLD AUTO: 7.68 K/UL — HIGH (ref 1.8–7.4)
NEUTROPHILS NFR BLD AUTO: 62.8 % — SIGNIFICANT CHANGE UP (ref 43–77)
NRBC # FLD: 0.04 — SIGNIFICANT CHANGE UP
PLATELET # BLD AUTO: 353 K/UL — SIGNIFICANT CHANGE UP (ref 150–400)
PMV BLD: 9.6 FL — SIGNIFICANT CHANGE UP (ref 7–13)
RBC # BLD: 5.12 M/UL — SIGNIFICANT CHANGE UP (ref 3.8–5.2)
RBC # FLD: 19.1 % — HIGH (ref 10.3–14.5)
RH IG SCN BLD-IMP: POSITIVE — SIGNIFICANT CHANGE UP
RUBV IGG SER-ACNC: 12.1 INDEX — SIGNIFICANT CHANGE UP
RUBV IGG SER-ACNC: 8.5 INDEX — SIGNIFICANT CHANGE UP
RUBV IGG SER-IMP: POSITIVE — SIGNIFICANT CHANGE UP
RUBV IGG SER-IMP: POSITIVE — SIGNIFICANT CHANGE UP
T PALLIDUM AB TITR SER: NEGATIVE — SIGNIFICANT CHANGE UP
WBC # BLD: 12.21 K/UL — HIGH (ref 3.8–10.5)
WBC # FLD AUTO: 12.21 K/UL — HIGH (ref 3.8–10.5)

## 2018-06-28 RX ORDER — OXYTOCIN 10 UNIT/ML
333.33 VIAL (ML) INJECTION
Qty: 20 | Refills: 0 | Status: DISCONTINUED | OUTPATIENT
Start: 2018-06-28 | End: 2018-06-29

## 2018-06-28 RX ORDER — SODIUM CHLORIDE 9 MG/ML
1000 INJECTION, SOLUTION INTRAVENOUS ONCE
Qty: 0 | Refills: 0 | Status: COMPLETED | OUTPATIENT
Start: 2018-06-28 | End: 2018-06-28

## 2018-06-28 RX ORDER — OXYTOCIN 10 UNIT/ML
2 VIAL (ML) INJECTION
Qty: 30 | Refills: 0 | Status: DISCONTINUED | OUTPATIENT
Start: 2018-06-28 | End: 2018-06-29

## 2018-06-28 RX ORDER — SODIUM CHLORIDE 9 MG/ML
1000 INJECTION, SOLUTION INTRAVENOUS
Qty: 0 | Refills: 0 | Status: DISCONTINUED | OUTPATIENT
Start: 2018-06-28 | End: 2018-06-29

## 2018-06-28 RX ORDER — OXYTOCIN 10 UNIT/ML
333.33 VIAL (ML) INJECTION
Qty: 20 | Refills: 0 | Status: COMPLETED | OUTPATIENT
Start: 2018-06-28

## 2018-06-28 RX ADMIN — SODIUM CHLORIDE 2000 MILLILITER(S): 9 INJECTION, SOLUTION INTRAVENOUS at 07:32

## 2018-06-28 RX ADMIN — Medication 2 MILLIUNIT(S)/MIN: at 20:42

## 2018-06-28 RX ADMIN — SODIUM CHLORIDE 250 MILLILITER(S): 9 INJECTION, SOLUTION INTRAVENOUS at 09:02

## 2018-06-29 RX ORDER — PRAMOXINE HYDROCHLORIDE 150 MG/15G
1 AEROSOL, FOAM RECTAL EVERY 4 HOURS
Qty: 0 | Refills: 0 | Status: DISCONTINUED | OUTPATIENT
Start: 2018-06-29 | End: 2018-06-30

## 2018-06-29 RX ORDER — OXYTOCIN 10 UNIT/ML
41.67 VIAL (ML) INJECTION
Qty: 20 | Refills: 0 | Status: DISCONTINUED | OUTPATIENT
Start: 2018-06-29 | End: 2018-06-30

## 2018-06-29 RX ORDER — KETOROLAC TROMETHAMINE 30 MG/ML
30 SYRINGE (ML) INJECTION ONCE
Qty: 0 | Refills: 0 | Status: DISCONTINUED | OUTPATIENT
Start: 2018-06-29 | End: 2018-06-30

## 2018-06-29 RX ORDER — SIMETHICONE 80 MG/1
80 TABLET, CHEWABLE ORAL EVERY 6 HOURS
Qty: 0 | Refills: 0 | Status: DISCONTINUED | OUTPATIENT
Start: 2018-06-29 | End: 2018-07-01

## 2018-06-29 RX ORDER — IBUPROFEN 200 MG
600 TABLET ORAL EVERY 6 HOURS
Qty: 0 | Refills: 0 | Status: DISCONTINUED | OUTPATIENT
Start: 2018-06-29 | End: 2018-06-30

## 2018-06-29 RX ORDER — ACETAMINOPHEN 500 MG
650 TABLET ORAL EVERY 6 HOURS
Qty: 0 | Refills: 0 | Status: DISCONTINUED | OUTPATIENT
Start: 2018-06-29 | End: 2018-06-30

## 2018-06-29 RX ORDER — AER TRAVELER 0.5 G/1
1 SOLUTION RECTAL; TOPICAL EVERY 4 HOURS
Qty: 0 | Refills: 0 | Status: DISCONTINUED | OUTPATIENT
Start: 2018-06-29 | End: 2018-06-29

## 2018-06-29 RX ORDER — DOCUSATE SODIUM 100 MG
100 CAPSULE ORAL
Qty: 0 | Refills: 0 | Status: DISCONTINUED | OUTPATIENT
Start: 2018-06-29 | End: 2018-07-01

## 2018-06-29 RX ORDER — SODIUM CHLORIDE 9 MG/ML
3 INJECTION INTRAMUSCULAR; INTRAVENOUS; SUBCUTANEOUS EVERY 8 HOURS
Qty: 0 | Refills: 0 | Status: DISCONTINUED | OUTPATIENT
Start: 2018-06-29 | End: 2018-07-01

## 2018-06-29 RX ORDER — DIBUCAINE 1 %
1 OINTMENT (GRAM) RECTAL EVERY 4 HOURS
Qty: 0 | Refills: 0 | Status: DISCONTINUED | OUTPATIENT
Start: 2018-06-29 | End: 2018-07-01

## 2018-06-29 RX ORDER — DIPHENHYDRAMINE HCL 50 MG
25 CAPSULE ORAL EVERY 6 HOURS
Qty: 0 | Refills: 0 | Status: DISCONTINUED | OUTPATIENT
Start: 2018-06-29 | End: 2018-07-01

## 2018-06-29 RX ORDER — MAGNESIUM HYDROXIDE 400 MG/1
30 TABLET, CHEWABLE ORAL
Qty: 0 | Refills: 0 | Status: DISCONTINUED | OUTPATIENT
Start: 2018-06-29 | End: 2018-07-01

## 2018-06-29 RX ORDER — ACETAMINOPHEN 500 MG
975 TABLET ORAL EVERY 6 HOURS
Qty: 0 | Refills: 0 | Status: COMPLETED | OUTPATIENT
Start: 2018-06-29 | End: 2019-05-28

## 2018-06-29 RX ORDER — TETANUS TOXOID, REDUCED DIPHTHERIA TOXOID AND ACELLULAR PERTUSSIS VACCINE, ADSORBED 5; 2.5; 8; 8; 2.5 [IU]/.5ML; [IU]/.5ML; UG/.5ML; UG/.5ML; UG/.5ML
0.5 SUSPENSION INTRAMUSCULAR ONCE
Qty: 0 | Refills: 0 | Status: COMPLETED | OUTPATIENT
Start: 2018-06-29

## 2018-06-29 RX ORDER — HYDROCORTISONE 1 %
1 OINTMENT (GRAM) TOPICAL EVERY 4 HOURS
Qty: 0 | Refills: 0 | Status: DISCONTINUED | OUTPATIENT
Start: 2018-06-29 | End: 2018-06-30

## 2018-06-29 RX ORDER — OXYCODONE AND ACETAMINOPHEN 5; 325 MG/1; MG/1
2 TABLET ORAL EVERY 6 HOURS
Qty: 0 | Refills: 0 | Status: DISCONTINUED | OUTPATIENT
Start: 2018-06-29 | End: 2018-06-30

## 2018-06-29 RX ORDER — SODIUM CHLORIDE 9 MG/ML
3 INJECTION INTRAMUSCULAR; INTRAVENOUS; SUBCUTANEOUS EVERY 8 HOURS
Qty: 0 | Refills: 0 | Status: DISCONTINUED | OUTPATIENT
Start: 2018-06-29 | End: 2018-06-30

## 2018-06-29 RX ORDER — GLYCERIN ADULT
1 SUPPOSITORY, RECTAL RECTAL AT BEDTIME
Qty: 0 | Refills: 0 | Status: DISCONTINUED | OUTPATIENT
Start: 2018-06-29 | End: 2018-07-01

## 2018-06-29 RX ORDER — LANOLIN
1 OINTMENT (GRAM) TOPICAL EVERY 6 HOURS
Qty: 0 | Refills: 0 | Status: DISCONTINUED | OUTPATIENT
Start: 2018-06-29 | End: 2018-07-01

## 2018-06-29 RX ORDER — DIBUCAINE 1 %
1 OINTMENT (GRAM) RECTAL EVERY 4 HOURS
Qty: 0 | Refills: 0 | Status: DISCONTINUED | OUTPATIENT
Start: 2018-06-29 | End: 2018-06-29

## 2018-06-29 RX ORDER — IBUPROFEN 200 MG
600 TABLET ORAL EVERY 6 HOURS
Qty: 0 | Refills: 0 | Status: COMPLETED | OUTPATIENT
Start: 2018-06-29 | End: 2019-05-28

## 2018-06-29 RX ORDER — AER TRAVELER 0.5 G/1
1 SOLUTION RECTAL; TOPICAL EVERY 4 HOURS
Qty: 0 | Refills: 0 | Status: DISCONTINUED | OUTPATIENT
Start: 2018-06-29 | End: 2018-07-01

## 2018-06-29 RX ORDER — OXYCODONE HYDROCHLORIDE 5 MG/1
5 TABLET ORAL EVERY 4 HOURS
Qty: 0 | Refills: 0 | Status: DISCONTINUED | OUTPATIENT
Start: 2018-06-29 | End: 2018-07-01

## 2018-06-29 RX ORDER — OXYCODONE HYDROCHLORIDE 5 MG/1
5 TABLET ORAL
Qty: 0 | Refills: 0 | Status: DISCONTINUED | OUTPATIENT
Start: 2018-06-29 | End: 2018-07-01

## 2018-06-29 RX ADMIN — OXYCODONE HYDROCHLORIDE 5 MILLIGRAM(S): 5 TABLET ORAL at 18:30

## 2018-06-29 RX ADMIN — Medication 650 MILLIGRAM(S): at 06:51

## 2018-06-29 RX ADMIN — Medication 650 MILLIGRAM(S): at 23:00

## 2018-06-29 RX ADMIN — Medication 650 MILLIGRAM(S): at 14:00

## 2018-06-29 RX ADMIN — OXYCODONE HYDROCHLORIDE 5 MILLIGRAM(S): 5 TABLET ORAL at 14:00

## 2018-06-29 RX ADMIN — Medication 600 MILLIGRAM(S): at 22:19

## 2018-06-29 RX ADMIN — Medication 600 MILLIGRAM(S): at 13:12

## 2018-06-29 RX ADMIN — OXYCODONE HYDROCHLORIDE 5 MILLIGRAM(S): 5 TABLET ORAL at 17:51

## 2018-06-29 RX ADMIN — Medication 125 MILLIUNIT(S)/MIN: at 02:18

## 2018-06-29 RX ADMIN — OXYCODONE HYDROCHLORIDE 5 MILLIGRAM(S): 5 TABLET ORAL at 09:15

## 2018-06-29 RX ADMIN — Medication 650 MILLIGRAM(S): at 22:19

## 2018-06-29 RX ADMIN — OXYCODONE HYDROCHLORIDE 5 MILLIGRAM(S): 5 TABLET ORAL at 03:26

## 2018-06-29 RX ADMIN — Medication 650 MILLIGRAM(S): at 13:12

## 2018-06-29 RX ADMIN — Medication 600 MILLIGRAM(S): at 07:30

## 2018-06-29 RX ADMIN — Medication 600 MILLIGRAM(S): at 14:00

## 2018-06-29 RX ADMIN — OXYCODONE HYDROCHLORIDE 5 MILLIGRAM(S): 5 TABLET ORAL at 13:12

## 2018-06-29 RX ADMIN — Medication 600 MILLIGRAM(S): at 06:49

## 2018-06-29 RX ADMIN — OXYCODONE HYDROCHLORIDE 5 MILLIGRAM(S): 5 TABLET ORAL at 10:00

## 2018-06-30 LAB
BASOPHILS # BLD AUTO: 0.05 K/UL — SIGNIFICANT CHANGE UP (ref 0–0.2)
BASOPHILS NFR BLD AUTO: 0.4 % — SIGNIFICANT CHANGE UP (ref 0–2)
EOSINOPHIL # BLD AUTO: 0.29 K/UL — SIGNIFICANT CHANGE UP (ref 0–0.5)
EOSINOPHIL NFR BLD AUTO: 2.5 % — SIGNIFICANT CHANGE UP (ref 0–6)
HCT VFR BLD CALC: 26.3 % — LOW (ref 34.5–45)
HGB BLD-MCNC: 7.2 G/DL — LOW (ref 11.5–15.5)
IMM GRANULOCYTES # BLD AUTO: 0.19 # — SIGNIFICANT CHANGE UP
IMM GRANULOCYTES NFR BLD AUTO: 1.6 % — HIGH (ref 0–1.5)
LYMPHOCYTES # BLD AUTO: 37.3 % — SIGNIFICANT CHANGE UP (ref 13–44)
LYMPHOCYTES # BLD AUTO: 4.34 K/UL — HIGH (ref 1–3.3)
MCHC RBC-ENTMCNC: 19 PG — LOW (ref 27–34)
MCHC RBC-ENTMCNC: 27.4 % — LOW (ref 32–36)
MCV RBC AUTO: 69.4 FL — LOW (ref 80–100)
MONOCYTES # BLD AUTO: 0.68 K/UL — SIGNIFICANT CHANGE UP (ref 0–0.9)
MONOCYTES NFR BLD AUTO: 5.8 % — SIGNIFICANT CHANGE UP (ref 2–14)
NEUTROPHILS # BLD AUTO: 6.1 K/UL — SIGNIFICANT CHANGE UP (ref 1.8–7.4)
NEUTROPHILS NFR BLD AUTO: 52.4 % — SIGNIFICANT CHANGE UP (ref 43–77)
NRBC # FLD: 0 — SIGNIFICANT CHANGE UP
PLATELET # BLD AUTO: 273 K/UL — SIGNIFICANT CHANGE UP (ref 150–400)
PMV BLD: 9.4 FL — SIGNIFICANT CHANGE UP (ref 7–13)
RBC # BLD: 3.79 M/UL — LOW (ref 3.8–5.2)
RBC # FLD: 18.6 % — HIGH (ref 10.3–14.5)
WBC # BLD: 11.65 K/UL — HIGH (ref 3.8–10.5)
WBC # FLD AUTO: 11.65 K/UL — HIGH (ref 3.8–10.5)

## 2018-06-30 RX ORDER — ACETAMINOPHEN 500 MG
975 TABLET ORAL EVERY 6 HOURS
Qty: 0 | Refills: 0 | Status: DISCONTINUED | OUTPATIENT
Start: 2018-06-30 | End: 2018-07-01

## 2018-06-30 RX ORDER — HYDROCORTISONE 1 %
1 OINTMENT (GRAM) TOPICAL EVERY 4 HOURS
Qty: 0 | Refills: 0 | Status: DISCONTINUED | OUTPATIENT
Start: 2018-06-30 | End: 2018-07-01

## 2018-06-30 RX ORDER — ASCORBIC ACID 60 MG
500 TABLET,CHEWABLE ORAL DAILY
Qty: 0 | Refills: 0 | Status: DISCONTINUED | OUTPATIENT
Start: 2018-06-30 | End: 2018-07-01

## 2018-06-30 RX ORDER — TETANUS TOXOID, REDUCED DIPHTHERIA TOXOID AND ACELLULAR PERTUSSIS VACCINE, ADSORBED 5; 2.5; 8; 8; 2.5 [IU]/.5ML; [IU]/.5ML; UG/.5ML; UG/.5ML; UG/.5ML
0.5 SUSPENSION INTRAMUSCULAR ONCE
Qty: 0 | Refills: 0 | Status: COMPLETED | OUTPATIENT
Start: 2018-06-30 | End: 2018-06-30

## 2018-06-30 RX ORDER — FERROUS SULFATE 325(65) MG
325 TABLET ORAL THREE TIMES A DAY
Qty: 0 | Refills: 0 | Status: DISCONTINUED | OUTPATIENT
Start: 2018-06-30 | End: 2018-07-01

## 2018-06-30 RX ORDER — IBUPROFEN 200 MG
1 TABLET ORAL
Qty: 0 | Refills: 0 | DISCHARGE
Start: 2018-06-30

## 2018-06-30 RX ORDER — IBUPROFEN 200 MG
600 TABLET ORAL EVERY 6 HOURS
Qty: 0 | Refills: 0 | Status: DISCONTINUED | OUTPATIENT
Start: 2018-06-30 | End: 2018-07-01

## 2018-06-30 RX ORDER — ACETAMINOPHEN 500 MG
3 TABLET ORAL
Qty: 0 | Refills: 0 | DISCHARGE
Start: 2018-06-30

## 2018-06-30 RX ORDER — PRAMOXINE HYDROCHLORIDE 150 MG/15G
1 AEROSOL, FOAM RECTAL EVERY 4 HOURS
Qty: 0 | Refills: 0 | Status: DISCONTINUED | OUTPATIENT
Start: 2018-06-30 | End: 2018-07-01

## 2018-06-30 RX ADMIN — Medication 1 TABLET(S): at 14:30

## 2018-06-30 RX ADMIN — Medication 600 MILLIGRAM(S): at 15:00

## 2018-06-30 RX ADMIN — Medication 975 MILLIGRAM(S): at 14:30

## 2018-06-30 RX ADMIN — OXYCODONE HYDROCHLORIDE 5 MILLIGRAM(S): 5 TABLET ORAL at 20:31

## 2018-06-30 RX ADMIN — Medication 600 MILLIGRAM(S): at 21:15

## 2018-06-30 RX ADMIN — OXYCODONE HYDROCHLORIDE 5 MILLIGRAM(S): 5 TABLET ORAL at 15:00

## 2018-06-30 RX ADMIN — Medication 600 MILLIGRAM(S): at 20:31

## 2018-06-30 RX ADMIN — OXYCODONE HYDROCHLORIDE 5 MILLIGRAM(S): 5 TABLET ORAL at 06:52

## 2018-06-30 RX ADMIN — Medication 600 MILLIGRAM(S): at 14:30

## 2018-06-30 RX ADMIN — Medication 975 MILLIGRAM(S): at 15:00

## 2018-06-30 RX ADMIN — Medication 975 MILLIGRAM(S): at 04:31

## 2018-06-30 RX ADMIN — Medication 975 MILLIGRAM(S): at 05:15

## 2018-06-30 RX ADMIN — OXYCODONE HYDROCHLORIDE 5 MILLIGRAM(S): 5 TABLET ORAL at 14:30

## 2018-06-30 RX ADMIN — OXYCODONE HYDROCHLORIDE 5 MILLIGRAM(S): 5 TABLET ORAL at 07:22

## 2018-06-30 RX ADMIN — TETANUS TOXOID, REDUCED DIPHTHERIA TOXOID AND ACELLULAR PERTUSSIS VACCINE, ADSORBED 0.5 MILLILITER(S): 5; 2.5; 8; 8; 2.5 SUSPENSION INTRAMUSCULAR at 06:30

## 2018-06-30 RX ADMIN — Medication 600 MILLIGRAM(S): at 05:15

## 2018-06-30 RX ADMIN — OXYCODONE HYDROCHLORIDE 5 MILLIGRAM(S): 5 TABLET ORAL at 21:15

## 2018-06-30 RX ADMIN — Medication 600 MILLIGRAM(S): at 04:31

## 2018-06-30 NOTE — PROGRESS NOTE ADULT - SUBJECTIVE AND OBJECTIVE BOX
POD#1 S/P Vaginal Delivery with epidural anesthesia    Patient is doing well.  OOBAA. Tolerating clears.  Pain is tolerable.  No residual anesthetic issues or complications noted.    Brandi Martins CRNA

## 2018-07-01 ENCOUNTER — TRANSCRIPTION ENCOUNTER (OUTPATIENT)
Age: 23
End: 2018-07-01

## 2018-07-01 VITALS
DIASTOLIC BLOOD PRESSURE: 60 MMHG | SYSTOLIC BLOOD PRESSURE: 108 MMHG | HEART RATE: 84 BPM | TEMPERATURE: 98 F | OXYGEN SATURATION: 99 % | RESPIRATION RATE: 18 BRPM

## 2018-07-01 RX ADMIN — Medication 600 MILLIGRAM(S): at 04:54

## 2018-07-01 RX ADMIN — Medication 600 MILLIGRAM(S): at 05:30

## 2018-07-01 RX ADMIN — OXYCODONE HYDROCHLORIDE 5 MILLIGRAM(S): 5 TABLET ORAL at 05:30

## 2018-07-01 RX ADMIN — Medication 975 MILLIGRAM(S): at 05:30

## 2018-07-01 RX ADMIN — Medication 975 MILLIGRAM(S): at 04:53

## 2018-07-01 RX ADMIN — OXYCODONE HYDROCHLORIDE 5 MILLIGRAM(S): 5 TABLET ORAL at 04:54

## 2018-07-01 NOTE — DISCHARGE NOTE OB - MEDICATION SUMMARY - MEDICATIONS TO STOP TAKING
I will STOP taking the medications listed below when I get home from the hospital:    meclizine 25 mg oral tablet  -- 1 tab(s) by mouth 3 times a day, As Needed for vertigo/dizziness.   -- May cause drowsiness.  Alcohol may intensify this effect.  Use care when operating dangerous machinery.    prenatal vitamins  -- 1 tab(s) by mouth once a day

## 2018-07-01 NOTE — DISCHARGE NOTE OB - PATIENT PORTAL LINK FT
You can access the [facility] Patient Portal, offered by [facility], by registering with the following website: [email or web address]

## 2018-07-01 NOTE — DISCHARGE NOTE OB - MATERIALS PROVIDED
Shaken Baby Prevention Handout/Birth Certificate Instructions/Viola  Immunization Record/Vaccinations/Guide to Postpartum Care/Discharge Medication Information for Patients and Families Pocket Guide

## 2018-07-01 NOTE — DISCHARGE NOTE OB - HOSPITAL COURSE
Patient presented in early labor and was augmented with pitocin after epidural .Uncomplicated vaginal delivery and hosptial course

## 2018-09-02 ENCOUNTER — TRANSCRIPTION ENCOUNTER (OUTPATIENT)
Age: 23
End: 2018-09-02

## 2019-06-18 NOTE — PATIENT PROFILE OB - PRO HIV INFANT
-epigastric pain in setting of elevated trop of 2.6  -EKG sinus w/ ST depressions in 1, V1, V5, V6  -EKG findings may be 2/2 to pt's cardiomyopathy per cardio  -Recent echo performed this week at [facility] sig for EF of 20% w/ pulm htn, and recent stress test sig for fixed defects to apical and septal walls  -will start treatment with heparin drip for now   -troponin peaked at 3.14 before trending down  -h/h lower today, on heparin gtt  -serial EKG's  -c/w asa, statin, and BB  -monitor h/h  - plan for stress test once pt stable  -f/u repeat echo  -Cardio: Dr. Hood -epigastric pain in setting of elevated trop of 2.6  -EKG sinus w/ ST depressions in 1, V1, V5, V6  -EKG findings may be 2/2 to pt's cardiomyopathy per cardio  -Recent echo performed this week at [facility] sig for EF of 20% w/ pulm htn, and recent stress test sig for fixed defects to apical and septal walls  -will start treatment with heparin drip for now   -continue to trend trops- T2@ 6p, and T3@ midnight   -serial EKG's  -c/w asa, statin, and BB  -f/u repeat echo  -Cardio: Dr. Hood -epigastric pain in setting of elevated trop of 2.6  -EKG sinus w/ ST depressions in 1, V1, V5, V6  -EKG findings may be 2/2 to pt's cardiomyopathy per cardio  -Recent echo performed this week at [facility] sig for EF of 20% w/ pulm htn, and recent stress test sig for fixed defects to apical and septal walls  -will start treatment with heparin drip for now   -troponin peaked at 3.14 before trending down  -h/h lower today, on heparin gtt  -serial EKG's  -c/w asa, statin, and BB  -monitor h/h  - plan for stress test once pt stable  -f/u repeat echo  -Cardio: Dr. Hood negative

## 2019-07-25 ENCOUNTER — EMERGENCY (EMERGENCY)
Facility: HOSPITAL | Age: 24
LOS: 1 days | Discharge: LEFT BEFORE DISCHARGE | End: 2019-07-25
Admitting: EMERGENCY MEDICINE

## 2019-07-25 VITALS
SYSTOLIC BLOOD PRESSURE: 111 MMHG | OXYGEN SATURATION: 98 % | HEART RATE: 87 BPM | DIASTOLIC BLOOD PRESSURE: 71 MMHG | TEMPERATURE: 98 F | RESPIRATION RATE: 18 BRPM

## 2019-07-25 NOTE — ED ADULT TRIAGE NOTE - CHIEF COMPLAINT QUOTE
Pt presents with c/o productive cough with green sputum and fever x 4 days. Of note, pt is 8 weeks pregnant. Pt states that she had an US with confirmed IUP. Pt without any ob complaints.

## 2019-10-24 ENCOUNTER — APPOINTMENT (OUTPATIENT)
Dept: ANTEPARTUM | Facility: CLINIC | Age: 24
End: 2019-10-24
Payer: COMMERCIAL

## 2019-10-24 ENCOUNTER — ASOB RESULT (OUTPATIENT)
Age: 24
End: 2019-10-24

## 2019-10-24 PROCEDURE — 76811 OB US DETAILED SNGL FETUS: CPT

## 2019-10-24 PROCEDURE — 76817 TRANSVAGINAL US OBSTETRIC: CPT

## 2019-10-31 ENCOUNTER — ASOB RESULT (OUTPATIENT)
Age: 24
End: 2019-10-31

## 2019-10-31 ENCOUNTER — APPOINTMENT (OUTPATIENT)
Dept: ANTEPARTUM | Facility: CLINIC | Age: 24
End: 2019-10-31
Payer: COMMERCIAL

## 2019-10-31 PROCEDURE — 76816 OB US FOLLOW-UP PER FETUS: CPT

## 2019-12-23 ENCOUNTER — APPOINTMENT (OUTPATIENT)
Dept: OBGYN | Facility: CLINIC | Age: 24
End: 2019-12-23
Payer: COMMERCIAL

## 2019-12-23 PROCEDURE — 76819 FETAL BIOPHYS PROFIL W/O NST: CPT

## 2019-12-23 PROCEDURE — 76816 OB US FOLLOW-UP PER FETUS: CPT

## 2020-01-09 ENCOUNTER — APPOINTMENT (OUTPATIENT)
Dept: OBGYN | Facility: CLINIC | Age: 25
End: 2020-01-09

## 2020-01-16 ENCOUNTER — APPOINTMENT (OUTPATIENT)
Dept: OBGYN | Facility: CLINIC | Age: 25
End: 2020-01-16

## 2020-02-06 ENCOUNTER — APPOINTMENT (OUTPATIENT)
Dept: ANTEPARTUM | Facility: CLINIC | Age: 25
End: 2020-02-06
Payer: COMMERCIAL

## 2020-02-06 PROCEDURE — 76815 OB US LIMITED FETUS(S): CPT

## 2020-02-06 PROCEDURE — 76818 FETAL BIOPHYS PROFILE W/NST: CPT

## 2020-02-19 ENCOUNTER — OUTPATIENT (OUTPATIENT)
Dept: INPATIENT UNIT | Facility: HOSPITAL | Age: 25
LOS: 1 days | Discharge: HOME | End: 2020-02-19

## 2020-02-19 VITALS
DIASTOLIC BLOOD PRESSURE: 65 MMHG | RESPIRATION RATE: 16 BRPM | SYSTOLIC BLOOD PRESSURE: 109 MMHG | TEMPERATURE: 98 F | OXYGEN SATURATION: 100 % | HEART RATE: 81 BPM

## 2020-02-19 VITALS — OXYGEN SATURATION: 100 % | HEART RATE: 77 BPM

## 2020-02-19 DIAGNOSIS — Z3A.00 WEEKS OF GESTATION OF PREGNANCY NOT SPECIFIED: ICD-10-CM

## 2020-02-19 DIAGNOSIS — O26.899 OTHER SPECIFIED PREGNANCY RELATED CONDITIONS, UNSPECIFIED TRIMESTER: ICD-10-CM

## 2020-02-19 LAB
ALBUMIN SERPL ELPH-MCNC: 2.9 G/DL — LOW (ref 3.3–5)
ALP SERPL-CCNC: 95 U/L — SIGNIFICANT CHANGE UP (ref 40–120)
ALT FLD-CCNC: 16 U/L — SIGNIFICANT CHANGE UP (ref 4–33)
AMYLASE P1 CFR SERPL: 55 U/L — SIGNIFICANT CHANGE UP (ref 25–125)
ANION GAP SERPL CALC-SCNC: 12 MMO/L — SIGNIFICANT CHANGE UP (ref 7–14)
APPEARANCE UR: CLEAR — SIGNIFICANT CHANGE UP
AST SERPL-CCNC: 26 U/L — SIGNIFICANT CHANGE UP (ref 4–32)
BACTERIA # UR AUTO: NEGATIVE — SIGNIFICANT CHANGE UP
BILIRUB SERPL-MCNC: 0.7 MG/DL — SIGNIFICANT CHANGE UP (ref 0.2–1.2)
BILIRUB UR-MCNC: NEGATIVE — SIGNIFICANT CHANGE UP
BLOOD UR QL VISUAL: HIGH
BUN SERPL-MCNC: 6 MG/DL — LOW (ref 7–23)
CALCIUM SERPL-MCNC: 8.7 MG/DL — SIGNIFICANT CHANGE UP (ref 8.4–10.5)
CHLORIDE SERPL-SCNC: 105 MMOL/L — SIGNIFICANT CHANGE UP (ref 98–107)
CO2 SERPL-SCNC: 19 MMOL/L — LOW (ref 22–31)
COLOR SPEC: SIGNIFICANT CHANGE UP
CREAT SERPL-MCNC: 0.52 MG/DL — SIGNIFICANT CHANGE UP (ref 0.5–1.3)
GLUCOSE SERPL-MCNC: 74 MG/DL — SIGNIFICANT CHANGE UP (ref 70–99)
GLUCOSE UR-MCNC: NEGATIVE — SIGNIFICANT CHANGE UP
HCT VFR BLD CALC: 38 % — SIGNIFICANT CHANGE UP (ref 34.5–45)
HGB BLD-MCNC: 11.2 G/DL — LOW (ref 11.5–15.5)
HYALINE CASTS # UR AUTO: NEGATIVE — SIGNIFICANT CHANGE UP
KETONES UR-MCNC: NEGATIVE — SIGNIFICANT CHANGE UP
LEUKOCYTE ESTERASE UR-ACNC: NEGATIVE — SIGNIFICANT CHANGE UP
LIDOCAIN IGE QN: 32.3 U/L — SIGNIFICANT CHANGE UP (ref 7–60)
MCHC RBC-ENTMCNC: 22.7 PG — LOW (ref 27–34)
MCHC RBC-ENTMCNC: 29.5 % — LOW (ref 32–36)
MCV RBC AUTO: 76.9 FL — LOW (ref 80–100)
NITRITE UR-MCNC: NEGATIVE — SIGNIFICANT CHANGE UP
NRBC # FLD: 0 K/UL — SIGNIFICANT CHANGE UP (ref 0–0)
PH UR: 7 — SIGNIFICANT CHANGE UP (ref 5–8)
PLATELET # BLD AUTO: 297 K/UL — SIGNIFICANT CHANGE UP (ref 150–400)
PMV BLD: 9.1 FL — SIGNIFICANT CHANGE UP (ref 7–13)
POTASSIUM SERPL-MCNC: 3.8 MMOL/L — SIGNIFICANT CHANGE UP (ref 3.5–5.3)
POTASSIUM SERPL-SCNC: 3.8 MMOL/L — SIGNIFICANT CHANGE UP (ref 3.5–5.3)
PROT SERPL-MCNC: 6.1 G/DL — SIGNIFICANT CHANGE UP (ref 6–8.3)
PROT UR-MCNC: NEGATIVE — SIGNIFICANT CHANGE UP
RBC # BLD: 4.94 M/UL — SIGNIFICANT CHANGE UP (ref 3.8–5.2)
RBC # FLD: 19.2 % — HIGH (ref 10.3–14.5)
RBC CASTS # UR COMP ASSIST: SIGNIFICANT CHANGE UP (ref 0–?)
SODIUM SERPL-SCNC: 136 MMOL/L — SIGNIFICANT CHANGE UP (ref 135–145)
SP GR SPEC: 1 — SIGNIFICANT CHANGE UP (ref 1–1.04)
SQUAMOUS # UR AUTO: SIGNIFICANT CHANGE UP
UROBILINOGEN FLD QL: NORMAL — SIGNIFICANT CHANGE UP
WBC # BLD: 8.16 K/UL — SIGNIFICANT CHANGE UP (ref 3.8–10.5)
WBC # FLD AUTO: 8.16 K/UL — SIGNIFICANT CHANGE UP (ref 3.8–10.5)
WBC UR QL: SIGNIFICANT CHANGE UP (ref 0–?)

## 2020-02-19 RX ORDER — SODIUM CHLORIDE 9 MG/ML
1000 INJECTION, SOLUTION INTRAVENOUS
Refills: 0 | Status: DISCONTINUED | OUTPATIENT
Start: 2020-02-19 | End: 2020-03-07

## 2020-02-19 NOTE — OB PROVIDER TRIAGE NOTE - NSOBPROVIDERNOTE_OBGYN_ALL_OB_FT
Patient is a 23y/o  @ 38 2/7wks gest. who reports to triage with c/o nausea, vomiting, diarrhea, lower abdominal and back pain x 3 days.   Patient ate last night states that she vomited the food she ate, 2 times.    Bowel movements x 2; formed, soft stools.    Denies fever, chills, lof or vaginal bleeding.     Denies eating out or sick contacts.    Reports good fetal movements.    Interim:  patient's last pn visit was at 36wks.  Next appointment is this wk.    AP Course:  GDMA2; on metformin                     UTI; on amoxicillin  Meds - pnv, iron, metformin 850mg hs, and Amoxicillin since .  NKDA    PMH/PSH/SH- denies  OB  - - 3/29/2015 -6lbs 11oz  - - 3/30/2017 - 6lbs 10oz  - - 2018 - 6lbs 15oz  GYN - PCOS    Vital Signs  T(C): 36.4 (2020 08:46), Max: 36.6 (2020 08:29)  HR: 74 (2020 09:44) (62 - 88)  BP: 105/62 (2020 08:47) (105/62 - 109/65)  RR: 16 (2020 08:29) (16 - 16)  SpO2: 98% (2020 09:44) (98% - 100%)    Heart RRR  Lungs CTAB  Avdomen gravid, soft and nontender  NST  SVE - 2.5/50/-3 Intact  Cephalic presentation  GBS - positive  Clinical EFW 2948G    Order:  amylase/lipase, cmp, cbc, ua & iv fluids. Patient is a 23y/o  @ 38 2/7wks gest. who reports to triage with c/o nausea, vomiting, diarrhea, lower abdominal and back pain x 3 days.   Patient ate last night states that she vomited the food she ate, 2 times.    Bowel movements x 2; formed, soft stools.    Denies fever, chills, lof or vaginal bleeding.     Denies eating out or sick contacts.    Reports good fetal movements.    Interim:  patient's last pn visit was at 36wks.  Next appointment is this wk.    AP Course:  GDMA2; on metformin                     UTI; on amoxicillin  Meds - pnv, iron, metformin 850mg hs, and Amoxicillin since .  NKDA    PMH/PSH/SH- denies  OB  - - 3/29/2015 -6lbs 11oz  - - 3/30/2017 - 6lbs 10oz  - - 2018 - 6lbs 15oz  GYN - PCOS    Vital Signs  T(C): 36.4 (2020 08:46), Max: 36.6 (2020 08:29)  HR: 74 (2020 09:44) (62 - 88)  BP: 105/62 (2020 08:47) (105/62 - 109/65)  RR: 16 (2020 08:29) (16 - 16)  SpO2: 98% (2020 09:44) (98% - 100%)    Heart RRR  Lungs CTAB  Avdomen gravid, soft and nontender  NST  SVE - 2.5/50/-3 Intact  Cephalic presentation  GBS - positive  Clinical EFW 2948G  Order:  amylase/lipase, cmp, cbc, ua & iv fluids.      1100  S/P IV Fluid bolus  Patient denies nausea.  No vomiting/ diarrhea.   Intermittent lower abdominal cramping.  Next pn appointment on 20.    Discussed po hydration.  Labor precautions and fetal kick count. Patient is a 23y/o  @ 38 2/7wks gest. who reports to triage with c/o nausea, vomiting, diarrhea, lower abdominal and back pain x 3 days.   Patient ate last night states that she vomited the food she ate, 2 times.    Bowel movements are formed but soft.    Denies fever, chills, lof or vaginal bleeding.   Denies eating out or sick contacts.    Reports good fetal movements.    Interim:  patient's last pn visit was at 36wks.  Next appointment is this wk.    AP Course:  GDMA2; on metformin                     UTI; on amoxicillin  Meds - pnv, iron, metformin 850mg hs, and Amoxicillin since .  NKDA    PMH/PSH/SH- denies  OB  - - 3/29/2015 -6lbs 11oz  - - 3/30/2017 - 6lbs 10oz  - - 2018 - 6lbs 15oz  GYN - PCOS    Vital Signs  T(C): 36.4 (2020 08:46), Max: 36.6 (2020 08:29)  HR: 74 (2020 09:44) (62 - 88)  BP: 105/62 (2020 08:47) (105/62 - 109/65)  RR: 16 (2020 08:29) (16 - 16)  SpO2: 98% (2020 09:44) (98% - 100%)    Heart RRR  Lungs CTAB  Avdomen gravid, soft and nontender  NST - cat 1 fht  SVE - 2.5/50/-3 Intact  Cephalic presentation  GBS - positive  Clinical EFW 2948G  Order:  amylase/lipase, cmp, cbc, ua & iv fluids.      1100  S/P IV Fluid bolus  Patient denies nausea.  No vomiting/ diarrhea.   Intermittent lower abdominal cramping.  Next pn appointment on 20.  No evidence of labor or dehydration.    Discussed po hydration, Labor precautions and fetal kick count.    Several attempts to reach Dr Merida unsuccessful.  Dr. Cody called.  Patient is cleared for discharge home.  To f/u with Dr Mccann on Friday as scheduled. Patient is a 23y/o  @ 38 2/7wks gest. who reports to triage with c/o nausea, vomiting, diarrhea, lower abdominal and back pain x 3 days.   Patient ate last night states that she vomited the food she ate, 2 times.    Bowel movements are formed but soft.    Denies fever, chills, lof or vaginal bleeding.   Denies eating out or sick contacts.    Reports good fetal movements.    Interim:  patient's last pn visit was at 36wks.  Next appointment is this wk.    AP Course:  GDMA2; on metformin                     UTI; on amoxicillin  Meds - pnv, iron, metformin 850mg hs, and Amoxicillin since .  NKDA    PMH/PSH/SH- denies  OB  - - 3/29/2015 -6lbs 11oz  - - 3/30/2017 - 6lbs 10oz  - - 2018 - 6lbs 15oz  GYN - PCOS    Vital Signs  T(C): 36.4 (2020 08:46), Max: 36.6 (2020 08:29)  HR: 74 (2020 09:44) (62 - 88)  BP: 105/62 (2020 08:47) (105/62 - 109/65)  RR: 16 (2020 08:29) (16 - 16)  SpO2: 98% (2020 09:44) (98% - 100%)    Heart RRR  Lungs CTAB  Avdomen gravid, soft and nontender  NST - cat 1 fht  SVE - 2.5/50/-3 Intact  Cephalic presentation  GBS - positive  Clinical EFW 2948G  Order:  amylase/lipase, cmp, cbc, ua & iv fluids.      1100  S/P IV Fluid bolus  Patient denies nausea.  No vomiting/ diarrhea.   Intermittent lower abdominal cramping.  Next pn appointment on 20.  No evidence of labor or dehydration.    Discussed po hydration, Labor precautions and fetal kick count.    Several attempts to reach Dr Merida unsuccessful.  Dr. Cody called.  Patient is cleared for discharge home.  To f/u with Dr Mccann on Friday as scheduled.    1302  Discussed patient with Dr Merida.  Plan:  patient is cleared for discharge home as stated above.  To f/u with  Dr Rosales as scheduled. Patient is a 23y/o  @ 38 2/7wks gest. who reports to triage with c/o nausea, vomiting, diarrhea, lower abdominal and back pain x 3 days.   Patient ate last night states that she vomited the food she ate, 2 times.    Bowel movements are formed but soft.    Denies fever, chills, lof or vaginal bleeding.   Denies eating out or sick contacts.    Reports good fetal movements.    Interim:  patient's last pn visit was at 36wks.  Next appointment is this wk.    AP Course:  GDMA2; on metformin                     UTI; on amoxicillin  Meds - pnv, iron, metformin 850mg hs, and Amoxicillin since .  NKDA    PMH/PSH/SH- denies  OB  - - 3/29/2015 -6lbs 11oz  - - 3/30/2017 - 6lbs 10oz  - - 2018 - 6lbs 15oz  GYN - PCOS    Vital Signs  T(C): 36.4 (2020 08:46), Max: 36.6 (2020 08:29)  HR: 74 (2020 09:44) (62 - 88)  BP: 105/62 (2020 08:47) (105/62 - 109/65)  RR: 16 (2020 08:29) (16 - 16)  SpO2: 98% (2020 09:44) (98% - 100%)    Heart RRR  Lungs CTAB  Avdomen gravid, soft and nontender  NST - cat 1 fht  SVE - 2.5/50/-3 Intact  Cephalic presentation  GBS - positive  Clinical EFW 2948G  Order:  amylase/lipase, cmp, cbc, ua & iv fluids.      1100  S/P IV Fluid bolus  Patient denies nausea.  No vomiting/ diarrhea.   Intermittent lower abdominal cramping.  Next pn appointment on 20.  No evidence of labor or dehydration.    Discussed po hydration, Labor precautions and fetal kick count.    Several attempts to reach Dr Merida unsuccessful.  Dr. Cody called.  Patient is cleared for discharge home.  To f/u with Dr Mccann on Friday as scheduled.    1302  Discussed patient with Dr Merida.  Plan:  patient is cleared for discharge home as stated above.  To f/u with  Dr Rosales as scheduled.        Associate Chief of L & D    Called by ZAID Ochoa that she was unable to contact the patient covering physician Dr Merida.  She is a patient of Dr Mccann who presented with irregular  contraction.  She was found by ZAID ochoa to be the same examination of 2-3/50/-3 which was reported as the same exam at 36 weeks.  Her labs were reviewed and she was given the signs and symptoms of labor and instructed to return if she breaks her water, has regular contraction any vaginal bleeding or anything she is not sure of.  She is also to keep her scheduled appointment with Dr Mccann when she returns.      Rashida Segundo M.D.    Addendum:    Dr. Magaña did return my call that was made to her.  I explained to her what was presented to me and asked that she call ZAID Ochoa who has seen and examined the patient.  Followed up with ZAID Ochoa who stated that she did indeed speak with Dr. Merida.    Rashida Segundo M.D.

## 2020-02-19 NOTE — OB RN TRIAGE NOTE - PMH
No pertinent past medical history    Vaginal delivery  2015 6-11  2017 6-10  2018 6-15 all uncomplicated FT

## 2020-02-19 NOTE — OB PROVIDER TRIAGE NOTE - NSHPLABSRESULTS_GEN_ALL_CORE
amylase/lipase, ua, cbc, cmp and iv fluids amylase/lipase, ua, cbc, cmp and iv fluids                          11.2   8.16  )-----------( 297      ( 2020 09:42 )             38.0     02-19    136  |  105  |  6<L>  ----------------------------<  74  3.8   |  19<L>  |  0.52    Ca    8.7      2020 09:42    TPro  6.1  /  Alb  2.9<L>  /  TBili  0.7  /  DBili  x   /  AST  26  /  ALT  16  /  AlkPhos  95  02-          Urinalysis Basic - ( 2020 10:07 )    Color: LIGHT YELLOW / Appearance: CLEAR / S.005 / pH: 7.0  Gluc: NEGATIVE / Ketone: NEGATIVE  / Bili: NEGATIVE / Urobili: NORMAL   Blood: MODERATE / Protein: NEGATIVE / Nitrite: NEGATIVE   Leuk Esterase: NEGATIVE / RBC: 0-2 / WBC 0-2   Sq Epi: OCC / Non Sq Epi: x / Bacteria: NEGATIVE

## 2020-02-19 NOTE — OB PROVIDER TRIAGE NOTE - NS_DCDISPOSITION_OBGYN_ALL_OB
Return to the Emergency Room if the following occurs:     Recurrent fainting, new chest pain or trouble breathing, or for any concern at anytime.      Or, follow-up with the following provider as we discussed:     Return to your primary doctor as needed.    Medications discussed:    None new.  No changes.    If you received pain-relieving or sedating medication during your time in the ER, avoid alcohol, driving automobiles, or working with machinery.  Also, a responsible adult must stay with you.      If you had X-rays or labs done we will attempt to contact you if there is a change needed in your care.      Call the Nurse Advice Line at [phone] or [phone] for any concern at anytime.     Home

## 2020-02-19 NOTE — OB RN TRIAGE NOTE - MENTAL HEALTH CONDITIONS/SYMPTOMS, PROFILE
depression/postpartum after last delivery depression/postpartum after last delivery , pt took meds x 1 mo  w/c she does not remember the name.

## 2020-02-19 NOTE — OB PROVIDER TRIAGE NOTE - HISTORY OF PRESENT ILLNESS
Patient is a 25y/o  @ 38 2/7wks gest. who reports to triage with c/o nausea, vomiting, diarrhea, lower abdominal and back pain x 3 days.   Patient ate last night states that she vomited the food she ate, 2 times.    Bowel movements x 2; formed, soft stools.    Denies fever, chills, lof or vaginal bleeding.     Denies eating out or sick contacts.    Reports good fetal movements.    Interim:  patient's last pn visit was at 36wks.  Next appointment is this wk.    AP Course:  GDMA2; on metformin                     UTI; on amoxicillin  Meds - pnv, iron, metformin 850mg hs, and Amoxicillin since .  NKDA Patient is a 23y/o  @ 38 2/7wks gest. who reports to triage with c/o nausea, vomiting, diarrhea, lower abdominal and back pain x 3 days.   Patient ate last night states that she vomited the food she ate, 2 times.    Bowel movements are formed but soft.    Denies fever, chills, lof or vaginal bleeding.   Denies eating out or sick contacts.    Reports good fetal movements.    Interim:  patient's last pn visit was at 36wks.  Next appointment is this wk.    AP Course:  GDMA2; on metformin                     UTI; on amoxicillin  Meds - pnv, iron, metformin 850mg hs, and Amoxicillin since .  NKDA

## 2020-02-19 NOTE — OB RN TRIAGE NOTE - CHIEF COMPLAINT QUOTE
diarrhea and vomiting x 3 days. this morning I had chest pain.  immed seen in [facility]. 109/65 HR 81 T 36.6 SAO2 100%

## 2020-02-19 NOTE — OB PROVIDER TRIAGE NOTE - ADDITIONAL INSTRUCTIONS
Discussed po hydration, Labor precautions and fetal kick count.  Patient is cleared for discharge home.  To f/u with Dr Mccann on Friday as scheduled.

## 2020-02-19 NOTE — OB PROVIDER TRIAGE NOTE - NSHPPHYSICALEXAM_GEN_ALL_CORE
Vital Signs Last 24 Hrs  T(C): 36.4 (19 Feb 2020 08:46), Max: 36.6 (19 Feb 2020 08:29)  T(F): 97.52 (19 Feb 2020 08:46), Max: 97.9 (19 Feb 2020 08:29)  HR: 74 (19 Feb 2020 09:44) (62 - 88)  BP: 105/62 (19 Feb 2020 08:47) (105/62 - 109/65)  BP(mean): --  RR: 16 (19 Feb 2020 08:29) (16 - 16)  SpO2: 98% (19 Feb 2020 09:49) (98% - 100%)    Heart RRR  Lungs CTAB  Avdomen gravid, soft and nontender  NST  SVE - 2.5/50/-3 Intact  Cephalic presentation  GBS - positive  Clinical EFW 2948G Vital Signs Last 24 Hrs  T(C): 36.4 (19 Feb 2020 08:46), Max: 36.6 (19 Feb 2020 08:29)  T(F): 97.52 (19 Feb 2020 08:46), Max: 97.9 (19 Feb 2020 08:29)  HR: 74 (19 Feb 2020 09:44) (62 - 88)  BP: 105/62 (19 Feb 2020 08:47) (105/62 - 109/65)  BP(mean): --  RR: 16 (19 Feb 2020 08:29) (16 - 16)  SpO2: 98% (19 Feb 2020 09:49) (98% - 100%)    Heart RRR  Lungs CTAB  Avdomen gravid, soft and nontender  NST - cat 1 fht  SVE - 2.5/50/-3 Intact  Cephalic presentation  GBS - positive  Clinical EFW 2948G

## 2020-02-20 ENCOUNTER — APPOINTMENT (OUTPATIENT)
Dept: ANTEPARTUM | Facility: CLINIC | Age: 25
End: 2020-02-20
Payer: COMMERCIAL

## 2020-02-20 PROCEDURE — 76818 FETAL BIOPHYS PROFILE W/NST: CPT

## 2020-02-20 PROCEDURE — 76816 OB US FOLLOW-UP PER FETUS: CPT

## 2020-02-20 PROCEDURE — 76820 UMBILICAL ARTERY ECHO: CPT

## 2020-02-27 ENCOUNTER — APPOINTMENT (OUTPATIENT)
Dept: ANTEPARTUM | Facility: CLINIC | Age: 25
End: 2020-02-27
Payer: COMMERCIAL

## 2020-02-27 PROCEDURE — 76818 FETAL BIOPHYS PROFILE W/NST: CPT

## 2020-02-28 ENCOUNTER — INPATIENT (INPATIENT)
Facility: HOSPITAL | Age: 25
LOS: 2 days | Discharge: HOME | End: 2020-03-02
Attending: OBSTETRICS & GYNECOLOGY | Admitting: OBSTETRICS & GYNECOLOGY

## 2020-02-28 VITALS
SYSTOLIC BLOOD PRESSURE: 112 MMHG | HEART RATE: 84 BPM | RESPIRATION RATE: 17 BRPM | TEMPERATURE: 98 F | DIASTOLIC BLOOD PRESSURE: 68 MMHG | OXYGEN SATURATION: 100 %

## 2020-02-28 DIAGNOSIS — O26.899 OTHER SPECIFIED PREGNANCY RELATED CONDITIONS, UNSPECIFIED TRIMESTER: ICD-10-CM

## 2020-02-28 DIAGNOSIS — Z3A.00 WEEKS OF GESTATION OF PREGNANCY NOT SPECIFIED: ICD-10-CM

## 2020-02-28 LAB
BASOPHILS # BLD AUTO: 0.02 K/UL — SIGNIFICANT CHANGE UP (ref 0–0.2)
BASOPHILS NFR BLD AUTO: 0.2 % — SIGNIFICANT CHANGE UP (ref 0–2)
BLD GP AB SCN SERPL QL: NEGATIVE — SIGNIFICANT CHANGE UP
EOSINOPHIL # BLD AUTO: 0.16 K/UL — SIGNIFICANT CHANGE UP (ref 0–0.5)
EOSINOPHIL NFR BLD AUTO: 1.3 % — SIGNIFICANT CHANGE UP (ref 0–6)
GLUCOSE BLDC GLUCOMTR-MCNC: 110 MG/DL — HIGH (ref 70–99)
GLUCOSE BLDC GLUCOMTR-MCNC: 117 MG/DL — HIGH (ref 70–99)
GLUCOSE BLDC GLUCOMTR-MCNC: 135 MG/DL — HIGH (ref 70–99)
HCT VFR BLD CALC: 37.7 % — SIGNIFICANT CHANGE UP (ref 34.5–45)
HGB BLD-MCNC: 11.3 G/DL — LOW (ref 11.5–15.5)
IMM GRANULOCYTES NFR BLD AUTO: 0.4 % — SIGNIFICANT CHANGE UP (ref 0–1.5)
LYMPHOCYTES # BLD AUTO: 25.3 % — SIGNIFICANT CHANGE UP (ref 13–44)
LYMPHOCYTES # BLD AUTO: 3 K/UL — SIGNIFICANT CHANGE UP (ref 1–3.3)
MCHC RBC-ENTMCNC: 22.8 PG — LOW (ref 27–34)
MCHC RBC-ENTMCNC: 30 % — LOW (ref 32–36)
MCV RBC AUTO: 76.2 FL — LOW (ref 80–100)
MONOCYTES # BLD AUTO: 0.75 K/UL — SIGNIFICANT CHANGE UP (ref 0–0.9)
MONOCYTES NFR BLD AUTO: 6.3 % — SIGNIFICANT CHANGE UP (ref 2–14)
NEUTROPHILS # BLD AUTO: 7.9 K/UL — HIGH (ref 1.8–7.4)
NEUTROPHILS NFR BLD AUTO: 66.5 % — SIGNIFICANT CHANGE UP (ref 43–77)
NRBC # FLD: 0 K/UL — SIGNIFICANT CHANGE UP (ref 0–0)
PLATELET # BLD AUTO: 330 K/UL — SIGNIFICANT CHANGE UP (ref 150–400)
PMV BLD: 9.2 FL — SIGNIFICANT CHANGE UP (ref 7–13)
RBC # BLD: 4.95 M/UL — SIGNIFICANT CHANGE UP (ref 3.8–5.2)
RBC # FLD: 18.2 % — HIGH (ref 10.3–14.5)
RH IG SCN BLD-IMP: POSITIVE — SIGNIFICANT CHANGE UP
WBC # BLD: 11.88 K/UL — HIGH (ref 3.8–10.5)
WBC # FLD AUTO: 11.88 K/UL — HIGH (ref 3.8–10.5)

## 2020-02-28 RX ORDER — AMPICILLIN TRIHYDRATE 250 MG
2 CAPSULE ORAL ONCE
Refills: 0 | Status: COMPLETED | OUTPATIENT
Start: 2020-02-28 | End: 2020-02-28

## 2020-02-28 RX ORDER — SODIUM CHLORIDE 9 MG/ML
1000 INJECTION, SOLUTION INTRAVENOUS
Refills: 0 | Status: DISCONTINUED | OUTPATIENT
Start: 2020-02-28 | End: 2020-02-29

## 2020-02-28 RX ORDER — OXYTOCIN 10 UNIT/ML
333.33 VIAL (ML) INJECTION
Qty: 20 | Refills: 0 | Status: DISCONTINUED | OUTPATIENT
Start: 2020-02-28 | End: 2020-02-29

## 2020-02-28 RX ORDER — AMPICILLIN TRIHYDRATE 250 MG
1 CAPSULE ORAL EVERY 4 HOURS
Refills: 0 | Status: DISCONTINUED | OUTPATIENT
Start: 2020-02-28 | End: 2020-02-29

## 2020-02-28 RX ORDER — AMPICILLIN TRIHYDRATE 250 MG
CAPSULE ORAL
Refills: 0 | Status: DISCONTINUED | OUTPATIENT
Start: 2020-02-28 | End: 2020-02-29

## 2020-02-28 RX ORDER — SODIUM CHLORIDE 9 MG/ML
1000 INJECTION INTRAMUSCULAR; INTRAVENOUS; SUBCUTANEOUS ONCE
Refills: 0 | Status: COMPLETED | OUTPATIENT
Start: 2020-02-28 | End: 2020-02-28

## 2020-02-28 RX ORDER — AMOXICILLIN 250 MG/5ML
1 SUSPENSION, RECONSTITUTED, ORAL (ML) ORAL
Qty: 0 | Refills: 0 | DISCHARGE

## 2020-02-28 RX ORDER — OXYTOCIN 10 UNIT/ML
2 VIAL (ML) INJECTION
Qty: 30 | Refills: 0 | Status: DISCONTINUED | OUTPATIENT
Start: 2020-02-28 | End: 2020-02-29

## 2020-02-28 RX ADMIN — SODIUM CHLORIDE 125 MILLILITER(S): 9 INJECTION, SOLUTION INTRAVENOUS at 18:53

## 2020-02-28 RX ADMIN — SODIUM CHLORIDE 2000 MILLILITER(S): 9 INJECTION INTRAMUSCULAR; INTRAVENOUS; SUBCUTANEOUS at 21:00

## 2020-02-28 RX ADMIN — Medication 208 GRAM(S): at 23:18

## 2020-02-28 RX ADMIN — Medication 0.25 MILLIGRAM(S): at 21:40

## 2020-02-28 RX ADMIN — Medication 2 MILLIUNIT(S)/MIN: at 23:23

## 2020-02-28 RX ADMIN — Medication 216 GRAM(S): at 19:20

## 2020-02-28 NOTE — CHART NOTE - NSCHARTNOTEFT_GEN_A_CORE
R4 OB Progress Note    Patient seen and examined at bedside with Dr Mccann and Dr Gomes. Patient without complaints.      T(C): 36.5 (02-28-20 @ 22:15), Max: 36.8 (02-28-20 @ 15:50)  HR: 100 (02-28-20 @ 22:29) (63 - 106)  BP: 105/59 (02-28-20 @ 22:26) (84/47 - 115/63)  RR: 20 (02-28-20 @ 18:14) (17 - 20)  SpO2: 100% (02-28-20 @ 22:29) (96% - 100%)    SVE 6/80/-3, LOT position    FHT indeterminante baseline, mod variablity  TOCO    AP: 24y y/o G with cat I tracing  -continue  -Ampicillin for GBS ppx  -cw Y Tubing, FS monitoring    LEIDA Copeland PGY4

## 2020-02-28 NOTE — PROGRESS NOTE ADULT - SUBJECTIVE AND OBJECTIVE BOX
Patient with recurrent variables and hypotension s/p epidural  which subsided with IV bolus as well as Terbutaline, vertex presentation now LOT .Attempts made to correct the presentation without success   and Peanut ball implemented.  Called to room as patient was experiencing vaginal pressure but the examination has not changed. Will start Pitocin and monitor closely .If there is no change then will have to proceed with . This was explained to the parents.

## 2020-02-28 NOTE — OB PROVIDER H&P - ASSESSMENT
24 year old female P3 at 39.5 wks labor/srom   GBS positive for coverage    consider pitocin if no labor progression   GDMA2 FS monitoring   case d/w Dr Mccann    admitted   order placed 24 year old female P3 at 39.5 wks labor/srom   GBS positive for coverage   decreased fm since srom  CAT 1 tracing    consider pitocin if no labor progression   GDMA2 FS monitoring   case d/w Dr Mccann    admitted   order placed

## 2020-02-28 NOTE — OB PROVIDER TRIAGE NOTE - NSHPPHYSICALEXAM_GEN_ALL_CORE
pt seen and examined   Vital Signs Last 24 Hrs  T(C): 36.7 (28 Feb 2020 18:14), Max: 36.8 (28 Feb 2020 15:50)  T(F): 98.1 (28 Feb 2020 18:14), Max: 98.2 (28 Feb 2020 15:50)  HR: 85 (28 Feb 2020 18:14) (84 - 85)  BP: 98/71 (28 Feb 2020 18:14) (98/71 - 112/68)  BP(mean): --  RR: 20 (28 Feb 2020 18:14) (17 - 20)  SpO2: 100% (28 Feb 2020 15:50) (100% - 100%)  pt alert  in NAD   lungs clear   Heart S1 S2   abd soft   gravid   Ve  pooling clear  fluid  Nitz positive      4/50/-3  vtx   placed on EFM   with acceleration  no decelerations  moderate variability

## 2020-02-28 NOTE — OB PROVIDER H&P - HISTORY OF PRESENT ILLNESS
24 year old female  at 39.5 week gestation who presents with SROM  at 140p and feeling mild contrxs q 5-10 minutes    pt is GDMA2 metformin   denied any other ap issues  denied any fetal issues      med hx denied  surghx denied   meds  pnvqd  Metformin   allergies NKDA   Cherries anaphylaxis   OB hx   3/29/2015  6.11#              3/20/ 2017  6.10#             2018   6.15#   Gyn hx denied 24 year old female  at 39.5 week gestation who presents with SROM  at 140p and feeling mild contrxs q 5-10 minutes    pt is GDMA2 metformin   denied any other ap issues  denied any fetal issues   feels decreased fm  since srom      med hx denied  surghx denied   meds  pnvqd  Metformin   allergies NKDA   Cherries anaphylaxis   OB hx   3/29/2015  6.11#              3/20/ 2017  6.10#             2018   6.15#   Gyn hx denied

## 2020-02-28 NOTE — OB PROVIDER TRIAGE NOTE - NSVAGDELIVDETA2_OBGYN_ALL_OB
Chief Complaint   Patient presents with   • Derm Problem     5 week wart follow up       HISTORY OF PRESENT ILLNESS:    The patient is a 47 year old female who goes by the name Audrey.     The patient presents in 5 week  follow up for warts located on the left plantar fifth metatarsophalangeal joint (MTP), right plantar fourth metatarsophalangeal joint (MTP), and right plantar forefoot x 4 after I treated them with cantharone plus.     At home the patient is treating with Mediplast.    Overall, the patient states that the warts are improving.    The patient would like to try the prescription for warts and would not like to treat with Cantharone Plus. Patient had vigorous response within 3 hours had large blister that was painful for 1 week, and would like not use again.  Previous treatments used by us here in the clinic to treat the patient's warts include:    Liquid Nitrogen           [x]  YES    []  NO x 2    Dichloroacetic Acid  []  YES    []  NO     Cantharone Plus  [x]  YES    []  NO x 1 -vigorous reaction within 3 hours   Candida Antigen Therapy []  YES    []  NO         ALLERGIES:   Allergen Reactions   • Bandage [Adhesive]        Current Outpatient Prescriptions   Medication Sig   • PARoxetine (PAXIL) 20 MG tablet TAKE TWO TABLETS BY MOUTH DAILY   • levonorgestrel (MIRENA, 52 MG,) 20 MCG/24HR IUD 1 each by Intrauterine route once. Inserted 2-23-17   • Multiple Vitamins-Calcium (ONE-A-DAY WOMENS PO) Take 1 tablet by mouth daily.     No current facility-administered medications for this visit.        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      REVIEW OF SYSTEMS:  Constitutional:  In general, the patient feels well:  Yes  Integument:  The patient denies any other signs or symptoms of skin disease:  Yes    PHYSICAL EXAMINATION:    The patient is well nourished, well developed, and alert and oriented to person, place and time with appropriate mood and affect.;    Verrucous papule(s) with thrombosed vessels on left plantar forefoot, right medial plantar forefoot consistent with verruca/wart(s), 2 total    I examined the patient's bilateral feet in detail. There were no other significant findings during this exam.    The patient was seen and examined by Lorenza Brooke MD.  in the presence of my medical assistant  Nilsa Poe .  This office visit note was in part created by Nilsa Poe acting also as a scribe for Lorenza Brooke MD.   Lorenza Brooke MD    reviewed the note for accuracy and completeness before signing.      ASSESSMENT AND PLAN:    1. Verruca/Viral warts x 2      We jointly decided on treatment in the office with Liquid Nitrogen cryodestruction:    PROCEDURE: After discussion of the risks, including hyper- or hypopigmentation, pain, redness, swelling, blistering and possibility of recurrence, and benefits of liquid nitrogen  therapy, oral consent was obtained. The lesion(s) on bilateral feet was/were cleansed with alcohol and pared down with a 15-blade scalpel.  The site(s) were then treated with two 30-second freeze-thaw cycles of cryotherapy. A simple Band-aid was placed over each treated site. Patient tolerated the procedure well without any complications.  Post-treatment wound care instructions were provided.      I prescribed cimetidine 800 mg by mouth two or three times daily. Dispense 90, refills 11.     For at home treatment, I recommended treatment with compounded 5-fluorouracil - salicylic acid 5-20% topical solution. Apply thin layer at bedtime and cover with tape. Remove tape in morning and wash area well. Repeat nightly. Wash hands after use. Dispense 10 mL, refills 1.     Discussed that this is a compounded medication containing 5-fluorouracil and salicylic acid. Risks of local irritation and rash were discussed. This medication should not be handled by or used by pregnant women.     Discussed that it is not covered by insurance and cost approximately $35 plus shipping and handling costs if the patient opts to have it shipped to their home. Patient/patient's parent(s) decided to proceed with this medication after discussion of cost. We discussed that [facility] pharmacy will contact them via phone and it will be shipped to their home, or any [facility] pharmacy, after payment is made.    We discussed that should be applied nightly to the warts, covered with occlusive bandage, and washed off in the morning. This should be repeated daily. It should be started 1 week if any in-office procedure was done today.      The patient should discontinue use ~1 week before follow up visit    Return to clinic May 2018 with Dr. Oreilly.      On 3/8/2018, Nilsa RYDER scribed the services personally performed by Lorenza Brooke MD    The documentation recorded by the scribe accurately and completely reflects the service(s) I  personally performed and the decisions made by me.      Spontaneous vertex

## 2020-02-28 NOTE — OB PROVIDER TRIAGE NOTE - NSOBPROVIDERNOTE_OBGYN_ALL_OB_FT
24 year old female P3 at 39.5 wks labor /srom    GBS positive for coverage   consider pitocin if no labor progression    EFW 3000g    admitted     orders placed   case d/w Dr Mccann 24 year old female P3 at 39.5 wks labor /srom   decreased fm    GBS positive for coverage   consider pitocin if no labor progression    EFW 3000g    admitted     orders placed   case d/w Dr Mccann

## 2020-02-28 NOTE — OB PROVIDER TRIAGE NOTE - HISTORY OF PRESENT ILLNESS
24 year old female  at 39.5 week gestation who presents with SROM  at 140p and feeling mild contrxs q 5-10 minutes    pt is GDMA2 metformin   denied any other ap issues  denied any fetal issues      med hx denied  surghx denied   meds  pnvqd  Metformin   allergies NKDA   Cherries anaphylaxis   OB hx   3/29/2015  6.11#              3/20/ 2017  6.10#             2018   6.15#   Gyn hx denied 24 year old female  at 39.5 week gestation who presents with SROM  at 140p and feeling mild contrxs q 5-10 minutes    pt is GDMA2 metformin   denied any other ap issues  denied any fetal issues   feels more fm while waiting  but still decreased from baseline      med hx denied  surghx denied   meds  pnvqd  Metformin   allergies NKDA   Cherries anaphylaxis   OB hx   3/29/2015  6.11#              3/20/ 2017  6.10#             2018   6.15#   Gyn hx denied

## 2020-02-28 NOTE — OB RN TRIAGE NOTE - MENTAL HEALTH CONDITIONS/SYMPTOMS, PROFILE
postpartum after last delivery , pt took meds x 1 mo  w/c she does not remember the name./depression

## 2020-02-29 LAB — T PALLIDUM AB TITR SER: NEGATIVE — SIGNIFICANT CHANGE UP

## 2020-02-29 RX ORDER — IBUPROFEN 200 MG
600 TABLET ORAL EVERY 6 HOURS
Refills: 0 | Status: DISCONTINUED | OUTPATIENT
Start: 2020-02-29 | End: 2020-03-01

## 2020-02-29 RX ORDER — SODIUM CHLORIDE 9 MG/ML
3 INJECTION INTRAMUSCULAR; INTRAVENOUS; SUBCUTANEOUS EVERY 8 HOURS
Refills: 0 | Status: DISCONTINUED | OUTPATIENT
Start: 2020-02-29 | End: 2020-03-02

## 2020-02-29 RX ORDER — IBUPROFEN 200 MG
600 TABLET ORAL EVERY 6 HOURS
Refills: 0 | Status: COMPLETED | OUTPATIENT
Start: 2020-02-29 | End: 2021-01-27

## 2020-02-29 RX ORDER — PRAMOXINE HYDROCHLORIDE 150 MG/15G
1 AEROSOL, FOAM RECTAL EVERY 4 HOURS
Refills: 0 | Status: DISCONTINUED | OUTPATIENT
Start: 2020-02-29 | End: 2020-03-02

## 2020-02-29 RX ORDER — ACETAMINOPHEN 500 MG
975 TABLET ORAL
Refills: 0 | Status: DISCONTINUED | OUTPATIENT
Start: 2020-02-29 | End: 2020-03-02

## 2020-02-29 RX ORDER — MAGNESIUM HYDROXIDE 400 MG/1
30 TABLET, CHEWABLE ORAL
Refills: 0 | Status: DISCONTINUED | OUTPATIENT
Start: 2020-02-29 | End: 2020-03-02

## 2020-02-29 RX ORDER — KETOROLAC TROMETHAMINE 30 MG/ML
30 SYRINGE (ML) INJECTION ONCE
Refills: 0 | Status: DISCONTINUED | OUTPATIENT
Start: 2020-02-29 | End: 2020-02-29

## 2020-02-29 RX ORDER — OXYTOCIN 10 UNIT/ML
333.33 VIAL (ML) INJECTION
Qty: 20 | Refills: 0 | Status: DISCONTINUED | OUTPATIENT
Start: 2020-02-29 | End: 2020-02-29

## 2020-02-29 RX ORDER — GLYCERIN ADULT
1 SUPPOSITORY, RECTAL RECTAL AT BEDTIME
Refills: 0 | Status: DISCONTINUED | OUTPATIENT
Start: 2020-02-29 | End: 2020-03-02

## 2020-02-29 RX ORDER — OXYCODONE HYDROCHLORIDE 5 MG/1
5 TABLET ORAL ONCE
Refills: 0 | Status: DISCONTINUED | OUTPATIENT
Start: 2020-02-29 | End: 2020-03-01

## 2020-02-29 RX ORDER — AER TRAVELER 0.5 G/1
1 SOLUTION RECTAL; TOPICAL EVERY 4 HOURS
Refills: 0 | Status: DISCONTINUED | OUTPATIENT
Start: 2020-02-29 | End: 2020-03-02

## 2020-02-29 RX ORDER — HYDROCORTISONE 1 %
1 OINTMENT (GRAM) TOPICAL EVERY 6 HOURS
Refills: 0 | Status: DISCONTINUED | OUTPATIENT
Start: 2020-02-29 | End: 2020-03-02

## 2020-02-29 RX ORDER — DIPHENHYDRAMINE HCL 50 MG
25 CAPSULE ORAL EVERY 6 HOURS
Refills: 0 | Status: DISCONTINUED | OUTPATIENT
Start: 2020-02-29 | End: 2020-03-02

## 2020-02-29 RX ORDER — SIMETHICONE 80 MG/1
80 TABLET, CHEWABLE ORAL EVERY 4 HOURS
Refills: 0 | Status: DISCONTINUED | OUTPATIENT
Start: 2020-02-29 | End: 2020-03-02

## 2020-02-29 RX ORDER — DIBUCAINE 1 %
1 OINTMENT (GRAM) RECTAL EVERY 6 HOURS
Refills: 0 | Status: DISCONTINUED | OUTPATIENT
Start: 2020-02-29 | End: 2020-03-02

## 2020-02-29 RX ORDER — LANOLIN
1 OINTMENT (GRAM) TOPICAL EVERY 6 HOURS
Refills: 0 | Status: DISCONTINUED | OUTPATIENT
Start: 2020-02-29 | End: 2020-03-02

## 2020-02-29 RX ORDER — OXYCODONE HYDROCHLORIDE 5 MG/1
5 TABLET ORAL
Refills: 0 | Status: DISCONTINUED | OUTPATIENT
Start: 2020-02-29 | End: 2020-03-01

## 2020-02-29 RX ORDER — TETANUS TOXOID, REDUCED DIPHTHERIA TOXOID AND ACELLULAR PERTUSSIS VACCINE, ADSORBED 5; 2.5; 8; 8; 2.5 [IU]/.5ML; [IU]/.5ML; UG/.5ML; UG/.5ML; UG/.5ML
0.5 SUSPENSION INTRAMUSCULAR ONCE
Refills: 0 | Status: COMPLETED | OUTPATIENT
Start: 2020-02-29

## 2020-02-29 RX ORDER — BENZOCAINE 10 %
1 GEL (GRAM) MUCOUS MEMBRANE EVERY 6 HOURS
Refills: 0 | Status: DISCONTINUED | OUTPATIENT
Start: 2020-02-29 | End: 2020-03-02

## 2020-02-29 RX ADMIN — Medication 975 MILLIGRAM(S): at 19:01

## 2020-02-29 RX ADMIN — Medication 975 MILLIGRAM(S): at 11:12

## 2020-02-29 RX ADMIN — Medication 600 MILLIGRAM(S): at 11:12

## 2020-02-29 RX ADMIN — OXYCODONE HYDROCHLORIDE 5 MILLIGRAM(S): 5 TABLET ORAL at 16:00

## 2020-02-29 RX ADMIN — Medication 1 TABLET(S): at 15:12

## 2020-02-29 RX ADMIN — Medication 975 MILLIGRAM(S): at 12:00

## 2020-02-29 RX ADMIN — Medication 30 MILLIGRAM(S): at 01:54

## 2020-02-29 RX ADMIN — Medication 30 MILLIGRAM(S): at 02:10

## 2020-02-29 RX ADMIN — OXYCODONE HYDROCHLORIDE 5 MILLIGRAM(S): 5 TABLET ORAL at 19:54

## 2020-02-29 RX ADMIN — Medication 975 MILLIGRAM(S): at 03:32

## 2020-02-29 RX ADMIN — OXYCODONE HYDROCHLORIDE 5 MILLIGRAM(S): 5 TABLET ORAL at 12:00

## 2020-02-29 RX ADMIN — Medication 975 MILLIGRAM(S): at 04:05

## 2020-02-29 RX ADMIN — Medication 600 MILLIGRAM(S): at 19:01

## 2020-02-29 RX ADMIN — Medication 600 MILLIGRAM(S): at 12:00

## 2020-02-29 RX ADMIN — Medication 600 MILLIGRAM(S): at 18:00

## 2020-02-29 RX ADMIN — OXYCODONE HYDROCHLORIDE 5 MILLIGRAM(S): 5 TABLET ORAL at 15:13

## 2020-02-29 RX ADMIN — OXYCODONE HYDROCHLORIDE 5 MILLIGRAM(S): 5 TABLET ORAL at 11:13

## 2020-02-29 RX ADMIN — SODIUM CHLORIDE 3 MILLILITER(S): 9 INJECTION INTRAMUSCULAR; INTRAVENOUS; SUBCUTANEOUS at 15:12

## 2020-02-29 RX ADMIN — OXYCODONE HYDROCHLORIDE 5 MILLIGRAM(S): 5 TABLET ORAL at 05:53

## 2020-02-29 RX ADMIN — Medication 1000 MILLIUNIT(S)/MIN: at 01:50

## 2020-02-29 RX ADMIN — SODIUM CHLORIDE 3 MILLILITER(S): 9 INJECTION INTRAMUSCULAR; INTRAVENOUS; SUBCUTANEOUS at 22:05

## 2020-02-29 RX ADMIN — SODIUM CHLORIDE 3 MILLILITER(S): 9 INJECTION INTRAMUSCULAR; INTRAVENOUS; SUBCUTANEOUS at 05:21

## 2020-02-29 RX ADMIN — Medication 975 MILLIGRAM(S): at 18:00

## 2020-02-29 RX ADMIN — OXYCODONE HYDROCHLORIDE 5 MILLIGRAM(S): 5 TABLET ORAL at 06:17

## 2020-02-29 RX ADMIN — OXYCODONE HYDROCHLORIDE 5 MILLIGRAM(S): 5 TABLET ORAL at 20:20

## 2020-02-29 NOTE — OB RN DELIVERY SUMMARY - NS_SEPSISRSKCALC_OBGYN_ALL_OB_FT
No temperature has been documented for this patient in CPN or on the OB Flowsheet. Ensure the highest temperature during labor was documented on the OB Flowsheet.  Rupture of membranes must be entered above.

## 2020-02-29 NOTE — OB PROVIDER DELIVERY SUMMARY - NSPROVIDERDELIVERYNOTE_OBGYN_ALL_OB_FT
of liveborn infant, APGARs 9/9 weight 2790 g in OA position. Head, shoulders and torso were delivered easily. The infant was suctioned and handed to patient. Delayed cord clamping was performed and a segment was cut and taken for blood gases. Gentle cord traction and fundal massage resulted in spontaneous delivery of an intact placenta. Pitocin was started; bimanual massage removed small clots from lower segment; uterine fundus was firm. Visualization of vaginal vault showed no laceration, intact sulci. Hemostasis was achieved.  Sponge count was correct. Patient cleaned and stable.     Sam Mccann PGY1  of liveborn infant, APGARs 9/9 weight 2790 g in OA position. Head, shoulders and torso were delivered easily. The infant was suctioned and handed to patient. Delayed cord clamping was performed and a segment was cut and taken for blood gases. Gentle cord traction and fundal massage resulted in spontaneous delivery of an intact placenta. Pitocin was started; bimanual massage removed small clots from lower segment; uterine fundus was firm. Visualization of vaginal vault showed no laceration, intact sulci. Hemostasis was achieved.  Sponge count was correct. Patient cleaned and stable.     Sam Mccann PGY1  I was scrubbed and present for the delivery .The above is correct.

## 2020-02-29 NOTE — OB NEONATOLOGY/PEDIATRICIAN DELIVERY SUMMARY - BABY A: APGAR 5 MIN MUSCLE TONE, DELIVERY
Name: Vonnie Taylor   Sex: female   : 2019 Current Immunizations: 
Immunization History Administered Date(s) Administered  DTaP 2017, 2018, 2018, 07/10/2019  Hep A Vaccine 10/29/2018, 07/10/2019  Hep B Vaccine 2017, 2017, 2018  Hib 2017, 2018, 2018, 2019  Influenza Vaccine 10/29/2018, 2018  MMR 2019  Pneumococcal Conjugate (PCV-13) 2017, 2018, 2018, 10/29/2018  Poliovirus vaccine 2017, 2018, 07/10/2019  Rotavirus, Live, Pentavalent Vaccine 2017, 2018, 2018  Varicella Virus Vaccine 10/29/2018 Allergies: Allergies as of 2020 - Review Complete 2020 Allergen Reaction Noted  Penicillins Hives 10/28/2019 (2) well flexed

## 2020-02-29 NOTE — OB NEONATOLOGY/PEDIATRICIAN DELIVERY SUMMARY - NSPEDSNEONOTESA_OBGYN_ALL_OB_FT
Baby is a 39+5 wk GA female born to a 25 y/o  mother via . Maternal history of GDM2 on Metformin and postpartum depression (Paroxetine x1 month) with previous pregnancy. Prenatal history uncomplicated. Maternal blood type A+. Prenatal labs negative, non-reactive, and immune. GBS positive on , given 2 dose Amp. SROM at 1340 on , clear fluids. Baby born vigorous and crying spontaneously. Warmed, dried, stimulated. Apgars 9/9. EOS 0.06. Mom plans to breastfeed and is uncertain about Hep B.

## 2020-03-01 RX ORDER — METFORMIN HYDROCHLORIDE 850 MG/1
1 TABLET ORAL
Qty: 0 | Refills: 0 | DISCHARGE

## 2020-03-01 RX ORDER — TETANUS TOXOID, REDUCED DIPHTHERIA TOXOID AND ACELLULAR PERTUSSIS VACCINE, ADSORBED 5; 2.5; 8; 8; 2.5 [IU]/.5ML; [IU]/.5ML; UG/.5ML; UG/.5ML; UG/.5ML
0.5 SUSPENSION INTRAMUSCULAR ONCE
Refills: 0 | Status: COMPLETED | OUTPATIENT
Start: 2020-03-01 | End: 2020-03-01

## 2020-03-01 RX ORDER — IBUPROFEN 200 MG
800 TABLET ORAL EVERY 8 HOURS
Refills: 0 | Status: DISCONTINUED | OUTPATIENT
Start: 2020-03-01 | End: 2020-03-02

## 2020-03-01 RX ORDER — BENZOYL PEROXIDE MICRONIZED 5.8 %
1 TOWELETTE (EA) TOPICAL
Qty: 0 | Refills: 0 | DISCHARGE

## 2020-03-01 RX ADMIN — Medication 1 TABLET(S): at 11:36

## 2020-03-01 RX ADMIN — TETANUS TOXOID, REDUCED DIPHTHERIA TOXOID AND ACELLULAR PERTUSSIS VACCINE, ADSORBED 0.5 MILLILITER(S): 5; 2.5; 8; 8; 2.5 SUSPENSION INTRAMUSCULAR at 20:44

## 2020-03-01 RX ADMIN — Medication 975 MILLIGRAM(S): at 12:30

## 2020-03-01 RX ADMIN — OXYCODONE HYDROCHLORIDE 5 MILLIGRAM(S): 5 TABLET ORAL at 07:46

## 2020-03-01 RX ADMIN — Medication 600 MILLIGRAM(S): at 12:30

## 2020-03-01 RX ADMIN — Medication 600 MILLIGRAM(S): at 11:36

## 2020-03-01 RX ADMIN — OXYCODONE HYDROCHLORIDE 5 MILLIGRAM(S): 5 TABLET ORAL at 13:52

## 2020-03-01 RX ADMIN — Medication 975 MILLIGRAM(S): at 05:41

## 2020-03-01 RX ADMIN — Medication 600 MILLIGRAM(S): at 05:41

## 2020-03-01 RX ADMIN — Medication 1 APPLICATION(S): at 05:41

## 2020-03-01 RX ADMIN — OXYCODONE HYDROCHLORIDE 5 MILLIGRAM(S): 5 TABLET ORAL at 20:44

## 2020-03-01 RX ADMIN — Medication 600 MILLIGRAM(S): at 06:10

## 2020-03-01 RX ADMIN — PRAMOXINE HYDROCHLORIDE 1 APPLICATION(S): 150 AEROSOL, FOAM RECTAL at 05:41

## 2020-03-01 RX ADMIN — Medication 1 SPRAY(S): at 05:41

## 2020-03-01 RX ADMIN — SODIUM CHLORIDE 3 MILLILITER(S): 9 INJECTION INTRAMUSCULAR; INTRAVENOUS; SUBCUTANEOUS at 05:42

## 2020-03-01 RX ADMIN — OXYCODONE HYDROCHLORIDE 5 MILLIGRAM(S): 5 TABLET ORAL at 21:10

## 2020-03-01 RX ADMIN — Medication 600 MILLIGRAM(S): at 17:11

## 2020-03-01 RX ADMIN — Medication 600 MILLIGRAM(S): at 18:04

## 2020-03-01 RX ADMIN — OXYCODONE HYDROCHLORIDE 5 MILLIGRAM(S): 5 TABLET ORAL at 14:50

## 2020-03-01 RX ADMIN — Medication 975 MILLIGRAM(S): at 17:12

## 2020-03-01 RX ADMIN — Medication 975 MILLIGRAM(S): at 18:04

## 2020-03-01 RX ADMIN — Medication 975 MILLIGRAM(S): at 11:36

## 2020-03-01 RX ADMIN — OXYCODONE HYDROCHLORIDE 5 MILLIGRAM(S): 5 TABLET ORAL at 08:30

## 2020-03-01 RX ADMIN — Medication 975 MILLIGRAM(S): at 06:10

## 2020-03-01 NOTE — DISCHARGE NOTE OB - MEDICATION SUMMARY - MEDICATIONS TO TAKE
I will START or STAY ON the medications listed below when I get home from the hospital:    ibuprofen 800 mg oral tablet  -- 1 tab(s) by mouth every 8 hours, As needed, Moderate Pain (4 - 6)  -- Indication: For pain

## 2020-03-01 NOTE — PROGRESS NOTE ADULT - SUBJECTIVE AND OBJECTIVE BOX
ANESTHESIA POST-EPIDURAL CHECK    24y Female s/p  DAY 1     No COMPLAINTS    NO APPARENT ANESTHESIA COMPLICATIONS

## 2020-03-01 NOTE — DISCHARGE NOTE OB - MATERIALS PROVIDED
Shaken Baby Prevention Handout/[facility] Hearing Screen Program/Breastfeeding Guide and Packet/Birth Certificate Instructions/Discharge Medication Information for Patients and Families Pocket Guide/Guide to Postpartum Care/Breastfeeding Log/Breastfeeding Mother’s Support Group Information/Tdap Vaccination (VIS Pub Date: 2012)/[place]  Immunization Record/Vaccinations/[facility] [place] Screening Program/Back To Sleep Handout

## 2020-03-01 NOTE — DISCHARGE NOTE OB - CARE PROVIDER_API CALL
Ani Mccann)  Obstetrics and Gynecology  [address] [place] [address], [address]  [place] [id number]  Phone: [phone]  Fax: [phone]  Follow Up Time:

## 2020-03-01 NOTE — PROGRESS NOTE ADULT - SUBJECTIVE AND OBJECTIVE BOX
PPD #1 S/P     patient tolerating floor milieu well .She is bonding well with the baby .Breast are full but non engorged. Nipples are normal   Fundus 2 ffb UMBILICUS AND NON TENDER  Appropriate lochia  Ext normal anatomy

## 2020-03-01 NOTE — DISCHARGE NOTE OB - MEDICATION SUMMARY - MEDICATIONS TO STOP TAKING
I will STOP taking the medications listed below when I get home from the hospital:    amoxicillin 875 mg oral tablet  -- 1 tab(s) by mouth every 12 hours    metFORMIN 850 mg oral tablet  -- 1 tab(s) by mouth once a day PM

## 2020-03-01 NOTE — DISCHARGE NOTE OB - HOSPITAL COURSE
Patient presented with SROM in labor. She received an epidural for pain management. Category II tracing and  she became fully dilated after starting Pitocin. Head was asynclitic and resolved   with the aid of the vaginal peanut and  delivered a viable female .Tolerating floor miliue well

## 2020-03-01 NOTE — DISCHARGE NOTE OB - PATIENT PORTAL LINK FT
You can access the FollowMyHealth Patient Portal offered by [facility] by registering at the following website: [email or web address]. By joining [facility]’s FollowMyHealth portal, you will also be able to view your health information using other applications (apps) compatible with our system.

## 2020-03-01 NOTE — DISCHARGE NOTE OB - CARE PLAN
Principal Discharge DX:	Vaginal delivery  Goal:	welllness  Assessment and plan of treatment:	normal examination. Discharge to home and follow u[p in 2 weeks in the office

## 2020-03-02 VITALS — RESPIRATION RATE: 18 BRPM | OXYGEN SATURATION: 100 % | TEMPERATURE: 98 F

## 2020-03-02 LAB
HCT VFR BLD CALC: 37.8 % — SIGNIFICANT CHANGE UP (ref 34.5–45)
HGB BLD-MCNC: 10.8 G/DL — LOW (ref 11.5–15.5)
MCHC RBC-ENTMCNC: 22.3 PG — LOW (ref 27–34)
MCHC RBC-ENTMCNC: 28.6 % — LOW (ref 32–36)
MCV RBC AUTO: 78.1 FL — LOW (ref 80–100)
NRBC # FLD: 0 K/UL — SIGNIFICANT CHANGE UP (ref 0–0)
PLATELET # BLD AUTO: 337 K/UL — SIGNIFICANT CHANGE UP (ref 150–400)
PMV BLD: 9.8 FL — SIGNIFICANT CHANGE UP (ref 7–13)
RBC # BLD: 4.84 M/UL — SIGNIFICANT CHANGE UP (ref 3.8–5.2)
RBC # FLD: 18.4 % — HIGH (ref 10.3–14.5)
WBC # BLD: 10.53 K/UL — HIGH (ref 3.8–10.5)
WBC # FLD AUTO: 10.53 K/UL — HIGH (ref 3.8–10.5)

## 2020-03-02 RX ORDER — IBUPROFEN 200 MG
1 TABLET ORAL
Qty: 0 | Refills: 0 | DISCHARGE
Start: 2020-03-02

## 2020-03-02 RX ADMIN — Medication 975 MILLIGRAM(S): at 11:44

## 2020-03-02 RX ADMIN — Medication 800 MILLIGRAM(S): at 02:10

## 2020-03-02 RX ADMIN — Medication 975 MILLIGRAM(S): at 02:10

## 2020-03-02 RX ADMIN — Medication 975 MILLIGRAM(S): at 01:44

## 2020-03-02 RX ADMIN — Medication 800 MILLIGRAM(S): at 09:00

## 2020-03-02 RX ADMIN — Medication 800 MILLIGRAM(S): at 09:30

## 2020-03-02 RX ADMIN — Medication 1 TABLET(S): at 11:14

## 2020-03-02 RX ADMIN — Medication 800 MILLIGRAM(S): at 01:43

## 2020-03-02 RX ADMIN — Medication 975 MILLIGRAM(S): at 11:14

## 2020-06-24 NOTE — PATIENT PROFILE OB - PRO CIRC OB
Called patient and conveyed MD note, patient verbalized understanding and agreed with plan.    Patient stated that she will call clinic back tomorrow to schedule appointments.    No further questions.   not applicable

## 2020-08-25 NOTE — OB RN DELIVERY SUMMARY - BABY A: APGAR 5 MIN REFLEX IRRITABILITY, DELIVERY
[email or web address]    Schedule DEXA  Follow up after to discuss in further detail    The 10-year ASCVD risk score (Naresh DOUGLAS Jr., et al., 2013) is: 11.2%    Values used to calculate the score:      Age: 69 years      Sex: Female      Is Non- : No      Diabetic: No      Tobacco smoker: No      Systolic Blood Pressure: 130 mmHg      Is BP treated: No      HDL Cholesterol: 50 mg/dL      Total Cholesterol: 218 mg/dL  Cholesterol risk 11.2%  Recommend statin rx  Otherwise diet--low in sugar and processed foods  40minutes 5 days per week exercise    Medicare Wellness Visit  Plan for Preventive Care    A good way for you to stay healthy is to use preventive care.  Medicare covers many services that can help you stay healthy.* The goal of these services is to find any health problems as quickly as possible. Finding problems early can help make them easier to treat.  Your personal plan below lists the services you may need and when they are due.     Health Maintenance Summary     Shingles Vaccine (1 of 2)  Overdue since 6/9/1999    Breast Cancer Screening (Every 2 Years)  Overdue since 3/22/2020    Influenza Vaccine (1)  Next due on 9/1/2020    Medicare Wellness Visit (Yearly)  Next due on 8/25/2021    Depression Screening (Yearly)  Next due on 8/25/2021    Colorectal Cancer Screening-Colonoscopy (Every 10 Years)  Next due on 4/21/2024    DTaP/Tdap/Td Vaccine (2 - Td)  Next due on 6/29/2030    Hepatitis C Screening   Completed    Osteoporosis Screening   Ordered on 7/21/2020    Pneumococcal Vaccine 65+   Completed    Hepatitis B Vaccine   Aged Out    Meningococcal Vaccine   Aged Out    HPV Vaccine   Aged Out           Preventive Care for Women and Men    Heart Screenings (Cardiovascular):  · Blood tests are used to check your cholesterol, lipid and triglyceride levels. High levels can increase your risk for heart  disease and stroke. High levels can be treated with medications, diet and exercise. Lowering your levels can help keep your heart and blood vessels healthy.  Your provider will order these tests if they are needed.    · An ultrasound is done to see if you have an abdominal aortic aneurysm (AAA).  This is an enlargement of one of the main blood vessels that delivers blood to the body.   In the [place], 9,000 deaths are caused by AAA.  You may not even know you have this problem and as many as 1 in 3 people will have a serious problem if it is not treated.  Early diagnosis allows for more effective treatment and cure.  If you have a family history of AAA or are a male age 65-75 who has smoked, you are at higher risk of an AAA.  Your provider can order this test, if needed.    Colorectal Screening:  · There are many tests that are used to check for cancer of your colon and rectum. You and your provider should discuss what test is best for you and when to have it done.  Options include:  · Screening Colonoscopy: exam of the entire colon, seen through a flexible lighted tube.  · Flexible Sigmoidoscopy: exam of the last third (sigmoid portion) of the colon and rectum, seen through a flexible lighted tube.  · Cologuard DNA stool test: a sample of your stool is used to screen for cancer and unseen blood in your stool.  · Fecal Occult Blood Test: a sample of your stool is studied to find any unseen blood    Flu Shot:  · An immunization that helps to prevent influenza (the flu). You should get this every year. The best time to get the shot is in the fall.    Pneumococcal Shot:  • Vaccines are available that can help prevent pneumococcal disease, which is any type of infection caused by Streptococcus pneumoniae bacteria.   Their use can prevent some cases of pneumonia, meningitis, and sepsis. There are two types of pneumococcal vaccines:   o Conjugate vaccines (PCV-13 or Prevnar 13®) - helps protect against the 13 types  of pneumococcal bacteria that are the most common causes of serious infections in children and adults.    o Polysaccharide vaccine (PPSV23 or Uejsqvsqp70®) - helps protect against 23 types of pneumococcal bacteria for patients who are recommended to get it.  These vaccines should be given at least 12 months apart.  A booster is usually not needed.     Hepatitis B Shot:  · An immunization that helps to protect people from getting Hepatitis B. Hepatitis B is a virus that spreads through contact with infected blood or body fluids. Many people with the virus do not have symptoms.  The virus can lead to serious problems, such as liver disease. Some people are at higher risk than others. Your doctor will tell you if you need this shot.     Diabetes Screening:  · A test to measure sugar (glucose) in your blood is called a fasting blood sugar. Fasting means you cannot have food or drink for at least 8 hours before the test. This test can detect diabetes long before you may notice symptoms.    Glaucoma Screening:  · Glaucoma screening is performed by your eye doctor. The test measures the fluid pressure inside your eyes to determine if you have glaucoma.     Hepatitis C Screening:  · A blood test to see if you have the hepatitis C virus.  Hepatitis C attacks the liver and is a major cause of chronic liver disease.  Medicare will cover a single screening for all adults born between 1945 & 1965, or high risk patients (people who have injected illegal drugs or people who have had blood transfusions).  High risk patients who continue to inject illegal drugs can be screened for Hepatitis C every year.    Smoking and Tobacco-Use Cessation Counseling:  · Tobacco is the single greatest cause of disease and early death in our country today. Medication and counseling together can increase a person’s chance of quitting for good.   · Medicare covers two quitting attempts per year, with four counseling sessions per attempt (eight  sessions in a 12 month period)    Preventive Screening tests for Women    Screening Mammograms and Breast Exams:  · An x-ray of your breasts to check for breast cancer before you or your doctor may be able to feel it.  If breast cancer is found early it can usually be treated with success.    Pelvic Exams and Pap Tests:  · An exam to check for cervical and vaginal cancer. A Pap test is a lab test in which cells are taken from your cervix and sent to the lab to look for signs of cervical cancer. If cancer of the cervix is found early, chances for a cure are good. Testing can generally end at age 65, or if a woman has a hysterectomy for a benign condition. Your provider may recommend more frequent testing if certain abnormal results are found.    Bone Mass Measurements:  · A painless x-ray of your bone density to see if you are at risk for a broken bone. Bone density refers to the thickness of bones or how tightly the bone tissue is packed.    Preventive Screening tests for Men    Prostate Screening:  · Should you have a prostate cancer test (PSA)?  It is up to you to decide if you want a prostate cancer test. Talk to your clinician to find out if the test is right for you.  Things for you to consider and talk about should include:  · Benefits and harms of the test  · Your family history  · How your race/ethnicity may influence the test  · If the test may impact other medical conditions you have  · Your values on screenings and treatments    *Medicare pays for many preventive services to keep you healthy. For some of these services, you might have to pay a deductible, coinsurance, and / or copayment.  The amounts vary depending on the type of services you need and the kind of Medicare health plan you have.                   (2) cough or sneeze

## 2020-12-23 NOTE — OB PROVIDER H&P - PRO INTERPRETER NEED 2
[facility]    Progress Note    Rafa Robledo Patient Status:  Inpatient    1949 MRN [id number]   Location [facility] 4W/SW/SE Attending Darwin Baltazar, [address] [facility] 15 PCP Carine Ram English

## 2021-01-08 NOTE — OB RN DELIVERY SUMMARY - NS_FORCEPSATTEMPT_OBGYN_ALL_OB
The patient feels that the cataract is significantly impacting daily activities and has elected cataract surgery. The risks, benefits, and alternatives to surgery were discussed. The patient elects to proceed with surgery. Forceps were not used

## 2022-01-10 NOTE — OB PROVIDER H&P - NS_TOLAC_OBGYN_ALL_OB_NU
The following medication needs a prior authorization:     Medication Name: dulaglutide (Trulicity)    Dosage: 1.5 mg/0.5 mg    Frequency: every 7 days    Directions for use: Inject 1.5 mg into the skin every 7 days    Diagnosis: Type 2 diabetes mellitus with other diabetic kidney complication, with long-term current use of insulin    Is the request for a reauthorization? no    Is the patient currently stable on therapy?     Please list all therapeutic alternatives previously used with start/end dates and outcome:  Metformin 500 mg (12-18-19 to present)  Novolin 70/30 (8-22-18 to present)  dulaglutide 0.75 mg/0.5 mg (2-7-18 to 8-22-18)  dulaglutide 1.5 mg/0.5 mg (8-22-18 to 9-19-18)  liraglutide 0.6 mg/0.1 ml (8-14-17 to 3-4-20)  Ozempic 1 mg (3-22-21 to 8-2-21)  victoza 0.6mg/0.1 ml (3-4-20 to 9-16-20)                 0

## 2022-06-09 ENCOUNTER — NON-APPOINTMENT (OUTPATIENT)
Age: 27
End: 2022-06-09

## 2022-06-11 ENCOUNTER — EMERGENCY (EMERGENCY)
Facility: HOSPITAL | Age: 27
LOS: 1 days | Discharge: HOME | End: 2022-06-11
Attending: EMERGENCY MEDICINE | Admitting: EMERGENCY MEDICINE
Payer: MEDICAID

## 2022-06-11 VITALS
RESPIRATION RATE: 17 BRPM | OXYGEN SATURATION: 100 % | HEART RATE: 62 BPM | TEMPERATURE: 98 F | SYSTOLIC BLOOD PRESSURE: 100 MMHG | DIASTOLIC BLOOD PRESSURE: 61 MMHG

## 2022-06-11 VITALS
RESPIRATION RATE: 16 BRPM | HEIGHT: 58 IN | TEMPERATURE: 99 F | OXYGEN SATURATION: 99 % | DIASTOLIC BLOOD PRESSURE: 77 MMHG | SYSTOLIC BLOOD PRESSURE: 110 MMHG | HEART RATE: 86 BPM

## 2022-06-11 LAB
ANION GAP SERPL CALC-SCNC: 8 MMOL/L — SIGNIFICANT CHANGE UP (ref 7–14)
APPEARANCE UR: CLEAR — SIGNIFICANT CHANGE UP
BACTERIA # UR AUTO: NEGATIVE — SIGNIFICANT CHANGE UP
BASOPHILS # BLD AUTO: 0.03 K/UL — SIGNIFICANT CHANGE UP (ref 0–0.2)
BASOPHILS NFR BLD AUTO: 0.3 % — SIGNIFICANT CHANGE UP (ref 0–2)
BILIRUB UR-MCNC: NEGATIVE — SIGNIFICANT CHANGE UP
BUN SERPL-MCNC: 9 MG/DL — SIGNIFICANT CHANGE UP (ref 7–23)
CALCIUM SERPL-MCNC: 8.5 MG/DL — SIGNIFICANT CHANGE UP (ref 8.4–10.5)
CHLORIDE SERPL-SCNC: 103 MMOL/L — SIGNIFICANT CHANGE UP (ref 98–107)
CO2 SERPL-SCNC: 24 MMOL/L — SIGNIFICANT CHANGE UP (ref 22–31)
COLOR SPEC: YELLOW — SIGNIFICANT CHANGE UP
CREAT SERPL-MCNC: 0.66 MG/DL — SIGNIFICANT CHANGE UP (ref 0.5–1.3)
DIFF PNL FLD: ABNORMAL
EGFR: 122 ML/MIN/1.73M2 — SIGNIFICANT CHANGE UP
EOSINOPHIL # BLD AUTO: 0.28 K/UL — SIGNIFICANT CHANGE UP (ref 0–0.5)
EOSINOPHIL NFR BLD AUTO: 3 % — SIGNIFICANT CHANGE UP (ref 0–6)
EPI CELLS # UR: 1 /HPF — SIGNIFICANT CHANGE UP (ref 0–5)
GLUCOSE SERPL-MCNC: 91 MG/DL — SIGNIFICANT CHANGE UP (ref 70–99)
GLUCOSE UR QL: NEGATIVE — SIGNIFICANT CHANGE UP
HCG SERPL-ACNC: <5 MIU/ML — SIGNIFICANT CHANGE UP
HCT VFR BLD CALC: 39.6 % — SIGNIFICANT CHANGE UP (ref 34.5–45)
HGB BLD-MCNC: 12.1 G/DL — SIGNIFICANT CHANGE UP (ref 11.5–15.5)
HYALINE CASTS # UR AUTO: 1 /LPF — SIGNIFICANT CHANGE UP (ref 0–7)
IANC: 5.49 K/UL — SIGNIFICANT CHANGE UP (ref 1.8–7.4)
IMM GRANULOCYTES NFR BLD AUTO: 0.3 % — SIGNIFICANT CHANGE UP (ref 0–1.5)
KETONES UR-MCNC: NEGATIVE — SIGNIFICANT CHANGE UP
LEUKOCYTE ESTERASE UR-ACNC: NEGATIVE — SIGNIFICANT CHANGE UP
LYMPHOCYTES # BLD AUTO: 2.9 K/UL — SIGNIFICANT CHANGE UP (ref 1–3.3)
LYMPHOCYTES # BLD AUTO: 30.8 % — SIGNIFICANT CHANGE UP (ref 13–44)
MAGNESIUM SERPL-MCNC: 1.8 MG/DL — SIGNIFICANT CHANGE UP (ref 1.6–2.6)
MCHC RBC-ENTMCNC: 25.9 PG — LOW (ref 27–34)
MCHC RBC-ENTMCNC: 30.6 GM/DL — LOW (ref 32–36)
MCV RBC AUTO: 84.6 FL — SIGNIFICANT CHANGE UP (ref 80–100)
MONOCYTES # BLD AUTO: 0.68 K/UL — SIGNIFICANT CHANGE UP (ref 0–0.9)
MONOCYTES NFR BLD AUTO: 7.2 % — SIGNIFICANT CHANGE UP (ref 2–14)
NEUTROPHILS # BLD AUTO: 5.49 K/UL — SIGNIFICANT CHANGE UP (ref 1.8–7.4)
NEUTROPHILS NFR BLD AUTO: 58.4 % — SIGNIFICANT CHANGE UP (ref 43–77)
NITRITE UR-MCNC: NEGATIVE — SIGNIFICANT CHANGE UP
NRBC # BLD: 0 /100 WBCS — SIGNIFICANT CHANGE UP
NRBC # FLD: 0 K/UL — SIGNIFICANT CHANGE UP
PH UR: 6 — SIGNIFICANT CHANGE UP (ref 5–8)
PHOSPHATE SERPL-MCNC: 3.3 MG/DL — SIGNIFICANT CHANGE UP (ref 2.5–4.5)
PLATELET # BLD AUTO: 293 K/UL — SIGNIFICANT CHANGE UP (ref 150–400)
POTASSIUM SERPL-MCNC: 4.1 MMOL/L — SIGNIFICANT CHANGE UP (ref 3.5–5.3)
POTASSIUM SERPL-SCNC: 4.1 MMOL/L — SIGNIFICANT CHANGE UP (ref 3.5–5.3)
PROT UR-MCNC: NEGATIVE — SIGNIFICANT CHANGE UP
RBC # BLD: 4.68 M/UL — SIGNIFICANT CHANGE UP (ref 3.8–5.2)
RBC # FLD: 14.1 % — SIGNIFICANT CHANGE UP (ref 10.3–14.5)
RBC CASTS # UR COMP ASSIST: 3 /HPF — SIGNIFICANT CHANGE UP (ref 0–4)
SODIUM SERPL-SCNC: 135 MMOL/L — SIGNIFICANT CHANGE UP (ref 135–145)
SP GR SPEC: 1.02 — SIGNIFICANT CHANGE UP (ref 1–1.05)
UROBILINOGEN FLD QL: SIGNIFICANT CHANGE UP
WBC # BLD: 9.41 K/UL — SIGNIFICANT CHANGE UP (ref 3.8–10.5)
WBC # FLD AUTO: 9.41 K/UL — SIGNIFICANT CHANGE UP (ref 3.8–10.5)
WBC UR QL: 2 /HPF — SIGNIFICANT CHANGE UP (ref 0–5)

## 2022-06-11 PROCEDURE — 76830 TRANSVAGINAL US NON-OB: CPT | Mod: 26

## 2022-06-11 PROCEDURE — 99285 EMERGENCY DEPT VISIT HI MDM: CPT

## 2022-06-11 RX ORDER — ACETAMINOPHEN 500 MG
650 TABLET ORAL ONCE
Refills: 0 | Status: COMPLETED | OUTPATIENT
Start: 2022-06-11 | End: 2022-06-11

## 2022-06-11 RX ADMIN — Medication 650 MILLIGRAM(S): at 03:46

## 2022-06-11 NOTE — ED PROVIDER NOTE - CLINICAL SUMMARY MEDICAL DECISION MAKING FREE TEXT BOX
Jennie - Pt is a 27 yo F with PCOS who presents with abd pain, UC sent to ED to r/o appy. No RLQ tenderness. Concern for UTI vs PCOS. Will check transvag US, labs, pain control Jennie - Pt is a 25 yo F with PCOS who presents with abd pain, UC sent to ED to r/o appy. No RLQ tenderness. low concern for appy. Concern for UTI vs PCOS. Will check transvag US, labs, pain control

## 2022-06-11 NOTE — ED ADULT TRIAGE NOTE - CHIEF COMPLAINT QUOTE
pt c/o lower abdominal x2 days and lower back pain, sent from urgent care for r/o appendicitis. last BM 2 days ago, no relief with dulcolax. LMP 5/6/22. denies urinary symptoms, fever, chills. PMHx pre-diabetes, low iron, ?PCOS

## 2022-06-11 NOTE — ED PROVIDER NOTE - PATIENT PORTAL LINK FT
You can access the FollowMyHealth Patient Portal offered by [facility] by registering at the following website: [email or web address]. By joining [employer]’s FollowMyHealth portal, you will also be able to view your health information using other applications (apps) compatible with our system.

## 2022-06-11 NOTE — ED ADULT NURSE NOTE - OBJECTIVE STATEMENT
Pt arrives to ED rm 26 A&Ox4 and ambulatory c/o RLQ and LLQ abd pain that radiates to the hypogastric region that has been going on for the past 2 days. PMHx anemia. Pt states that she has been constipated for the past 2 days, and describes the pain as stabbing. Abd is nondistended but is tender in bilateral lower quadrants when palpating. Pt states her last BM was 4 days ago but was normal to pt, denies any blood in stool. Pt denies urinary symptoms, nausea, vomiting, diarrhea, chest pain, SOB, and headache. PT awaiting MD cabrera

## 2022-06-11 NOTE — ED PROVIDER NOTE - ATTENDING CONTRIBUTION TO CARE
27yo F PMHX pre-SM and PCOS p/w 2 days of nonlateralizing lower abdominal pain, sent to ED from urgent care to 'rule out appendicitis'.  LMP 5/16/22. no vaginal discharge or bleeding, no dysuria or fevers    General: Patient alert in no apparent distress  Skin: Dry and intact  HEENT: Head atraumatic. Oral mucosa moist.   Eyes: Conjunctiva normal  Cardiac: Regular rhythm and rate. No pretibial edema b/l  Respiratory: Lungs clear b/l and symmetric. No respiratory distress. Able to speak in complete sentences.  Gastrointestinal: Abdomen soft, nondistended, mild suprapubic tenderness  Musculoskeletal: Moves all extremities spontaneously  Neurological: alert and oriented to person, place, and time  Psychiatric: Calm and cooperative    a/p  low suspicion appendicitis given exam  will get transvag US given h/o PCOS to eval for ovarian pathology  ua to check for uti, UCG

## 2022-06-11 NOTE — ED PROVIDER NOTE - NS ED ROS FT
GENERAL: No fever, chills  EYES: no vision changes, no discharge.   ENT: no difficulty swallowing or speaking   CARDIAC: no chest pain/pressure, SOB, lower extremity swelling  PULMONARY: no cough, SOB  GI: +abdominal pain, +decreased PO  : no dysuria  SKIN: no rashes  NEURO: no headache, lightheadedness, paresthesia  MSK: No joint pain, myalgia, weakness.

## 2022-06-11 NOTE — ED PROVIDER NOTE - NSICDXPASTMEDICALHX_GEN_ALL_CORE_FT
PAST MEDICAL HISTORY:  No pertinent past medical history     Vaginal delivery 2015 6-11  2017 6-10  2018 6-15 all uncomplicated FT

## 2022-06-11 NOTE — ED PROVIDER NOTE - PROGRESS NOTE DETAILS
Patient was re-evaluated and doing well. Results, including any incidental findings, were discussed. Return precautions and follow up were discussed. Patient verbalized understanding.

## 2022-06-11 NOTE — ED PROVIDER NOTE - OBJECTIVE STATEMENT
Pt is a 27 yo F with PCOS who presents with abd pain, UC sent to ED to r/o adrianna. Pain is suprapubic, not RLQ. No dysuria or hematuria. Has no vomiting or nausea but has decreased PO intake. Last BM 3-4 days ago, was normal at that time. LMP was May 16, pt is sexually active. No abd surgery history

## 2022-06-11 NOTE — ED PROVIDER NOTE - PHYSICAL EXAMINATION
Isa Schneider MD  GENERAL: Patient awake alert NAD.  HEENT: NC/AT, Moist mucous membranes, EOMI.  LUNGS: CTAB, no wheezes or crackles.   CARDIAC: RRR, no m/r/g.    ABDOMEN: Soft, +mild suprapubic abd tenderness, no RLQ tenderness, ND, No rebound, guarding. No CVA tenderness.   EXT: No edema. No calf tenderness.  MSK: no pain with movement, no deformities.  NEURO: A&Ox3. Moving all extremities.  SKIN: Warm and dry. No rash.  PSYCH: Normal affect.

## 2022-06-11 NOTE — ED PROVIDER NOTE - NSFOLLOWUPINSTRUCTIONS_ED_ALL_ED_FT
You were seen in the ED for abdominal pain.    Your blood work, urine, and ultrasound were reassuring.    For pain, please take 650mg Tylenol every 6 hours as needed or 600mg ibuprofen every 8 hours as needed. Always take ibuprofen with food. You make take both Tylenol and ibuprofen as described above if one medication is not enough for your pain.    Please follow up with your primary doctor in 2-3 days. Return to the ED if you experience any worsening or new symptoms or any symptoms that concern you, including fevers, chills, shortness of breath, chest pain, worsening abdominal pain, vomiting.

## 2022-06-12 LAB
CULTURE RESULTS: SIGNIFICANT CHANGE UP
SPECIMEN SOURCE: SIGNIFICANT CHANGE UP

## 2022-08-29 NOTE — PATIENT PROFILE OB - AMNIOTIC FLUID COLOR, LABOR
Dapsone Counseling: I discussed with the patient the risks of dapsone including but not limited to hemolytic anemia, agranulocytosis, rashes, methemoglobinemia, kidney failure, peripheral neuropathy, headaches, GI upset, and liver toxicity.  Patients who start dapsone require monitoring including baseline LFTs and weekly CBCs for the first month, then every month thereafter.  The patient verbalized understanding of the proper use and possible adverse effects of dapsone.  All of the patient's questions and concerns were addressed. Use Enhanced Medication Counseling?: No Isotretinoin Pregnancy And Lactation Text: This medication is Pregnancy Category X and is considered extremely dangerous during pregnancy. It is unknown if it is excreted in breast milk. High Dose Vitamin A Pregnancy And Lactation Text: High dose vitamin A therapy is contraindicated during pregnancy and breast feeding. Spironolactone Counseling: Patient advised regarding risks of diarrhea, abdominal pain, hyperkalemia, birth defects (for female patients), liver toxicity and renal toxicity. The patient may need blood work to monitor liver and kidney function and potassium levels while on therapy. The patient verbalized understanding of the proper use and possible adverse effects of spironolactone.  All of the patient's questions and concerns were addressed. Spironolactone Pregnancy And Lactation Text: This medication can cause feminization of the male fetus and should be avoided during pregnancy. The active metabolite is also found in breast milk. Benzoyl Peroxide Pregnancy And Lactation Text: This medication is Pregnancy Category C. It is unknown if benzoyl peroxide is excreted in breast milk. Tetracycline Counseling: Patient counseled regarding possible photosensitivity and increased risk for sunburn.  Patient instructed to avoid sunlight, if possible.  When exposed to sunlight, patients should wear protective clothing, sunglasses, and sunscreen.  The patient was instructed to call the office immediately if the following severe adverse effects occur:  hearing changes, easy bruising/bleeding, severe headache, or vision changes.  The patient verbalized understanding of the proper use and possible adverse effects of tetracycline.  All of the patient's questions and concerns were addressed. Patient understands to avoid pregnancy while on therapy due to potential birth defects. Azithromycin Counseling:  I discussed with the patient the risks of azithromycin including but not limited to GI upset, allergic reaction, drug rash, diarrhea, and yeast infections. Benzoyl Peroxide Counseling: Patient counseled that medicine may cause skin irritation and bleach clothing.  In the event of skin irritation, the patient was advised to reduce the amount of the drug applied or use it less frequently.   The patient verbalized understanding of the proper use and possible adverse effects of benzoyl peroxide.  All of the patient's questions and concerns were addressed. Azelaic Acid Pregnancy And Lactation Text: This medication is considered safe during pregnancy and breast feeding. Aklief Pregnancy And Lactation Text: It is unknown if this medication is safe to use during pregnancy.  It is unknown if this medication is excreted in breast milk.  Breastfeeding women should use the topical cream on the smallest area of the skin for the shortest time needed while breastfeeding.  Do not apply to nipple and areola. Birth Control Pills Counseling: Birth Control Pill Counseling: I discussed with the patient the potential side effects of OCPs including but not limited to increased risk of stroke, heart attack, thrombophlebitis, deep venous thrombosis, hepatic adenomas, breast changes, GI upset, headaches, and depression.  The patient verbalized understanding of the proper use and possible adverse effects of OCPs. All of the patient's questions and concerns were addressed. Bactrim Counseling:  I discussed with the patient the risks of sulfa antibiotics including but not limited to GI upset, allergic reaction, drug rash, diarrhea, dizziness, photosensitivity, and yeast infections.  Rarely, more serious reactions can occur including but not limited to aplastic anemia, agranulocytosis, methemoglobinemia, blood dyscrasias, liver or kidney failure, lung infiltrates or desquamative/blistering drug rashes. Erythromycin Pregnancy And Lactation Text: This medication is Pregnancy Category B and is considered safe during pregnancy. It is also excreted in breast milk. Doxycycline Pregnancy And Lactation Text: This medication is Pregnancy Category D and not consider safe during pregnancy. It is also excreted in breast milk but is considered safe for shorter treatment courses. Topical Sulfur Applications Pregnancy And Lactation Text: This medication is Pregnancy Category C and has an unknown safety profile during pregnancy. It is unknown if this topical medication is excreted in breast milk. Sarecycline Counseling: Patient advised regarding possible photosensitivity and discoloration of the teeth, skin, lips, tongue and gums.  Patient instructed to avoid sunlight, if possible.  When exposed to sunlight, patients should wear protective clothing, sunglasses, and sunscreen.  The patient was instructed to call the office immediately if the following severe adverse effects occur:  hearing changes, easy bruising/bleeding, severe headache, or vision changes.  The patient verbalized understanding of the proper use and possible adverse effects of sarecycline.  All of the patient's questions and concerns were addressed. Minocycline Counseling: Patient advised regarding possible photosensitivity and discoloration of the teeth, skin, lips, tongue and gums.  Patient instructed to avoid sunlight, if possible.  When exposed to sunlight, patients should wear protective clothing, sunglasses, and sunscreen.  The patient was instructed to call the office immediately if the following severe adverse effects occur:  hearing changes, easy bruising/bleeding, severe headache, or vision changes.  The patient verbalized understanding of the proper use and possible adverse effects of minocycline.  All of the patient's questions and concerns were addressed. Birth Control Pills Pregnancy And Lactation Text: This medication should be avoided if pregnant and for the first 30 days post-partum. Topical Retinoid counseling:  Patient advised to apply a pea-sized amount only at bedtime and wait 30 minutes after washing their face before applying.  If too drying, patient may add a non-comedogenic moisturizer. The patient verbalized understanding of the proper use and possible adverse effects of retinoids.  All of the patient's questions and concerns were addressed. Topical Sulfur Applications Counseling: Topical Sulfur Counseling: Patient counseled that this medication may cause skin irritation or allergic reactions.  In the event of skin irritation, the patient was advised to reduce the amount of the drug applied or use it less frequently.   The patient verbalized understanding of the proper use and possible adverse effects of topical sulfur application.  All of the patient's questions and concerns were addressed. Tetracycline Pregnancy And Lactation Text: This medication is Pregnancy Category D and not consider safe during pregnancy. It is also excreted in breast milk. Topical Clindamycin Pregnancy And Lactation Text: This medication is Pregnancy Category B and is considered safe during pregnancy. It is unknown if it is excreted in breast milk. Winlevi Counseling:  I discussed with the patient the risks of topical clascoterone including but not limited to erythema, scaling, itching, and stinging. Patient voiced their understanding. Topical Retinoid Pregnancy And Lactation Text: This medication is Pregnancy Category C. It is unknown if this medication is excreted in breast milk. Detail Level: Zone Dapsone Pregnancy And Lactation Text: This medication is Pregnancy Category C and is not considered safe during pregnancy or breast feeding. Isotretinoin Counseling: Patient should get monthly blood tests, not donate blood, not drive at night if vision affected, not share medication, and not undergo elective surgery for 6 months after tx completed. Side effects reviewed, pt to contact office should one occur. Bactrim Pregnancy And Lactation Text: This medication is Pregnancy Category D and is known to cause fetal risk.  It is also excreted in breast milk. High Dose Vitamin A Counseling: Side effects reviewed, pt to contact office should one occur. Winlevi Pregnancy And Lactation Text: This medication is considered safe during pregnancy and breastfeeding. Erythromycin Counseling:  I discussed with the patient the risks of erythromycin including but not limited to GI upset, allergic reaction, drug rash, diarrhea, increase in liver enzymes, and yeast infections. Azelaic Acid Counseling: Patient counseled that medicine may cause skin irritation and to avoid applying near the eyes.  In the event of skin irritation, the patient was advised to reduce the amount of the drug applied or use it less frequently.   The patient verbalized understanding of the proper use and possible adverse effects of azelaic acid.  All of the patient's questions and concerns were addressed. Tazorac Counseling:  Patient advised that medication is irritating and drying.  Patient may need to apply sparingly and wash off after an hour before eventually leaving it on overnight.  The patient verbalized understanding of the proper use and possible adverse effects of tazorac.  All of the patient's questions and concerns were addressed. Tazorac Pregnancy And Lactation Text: This medication is not safe during pregnancy. It is unknown if this medication is excreted in breast milk. Doxycycline Counseling:  Patient counseled regarding possible photosensitivity and increased risk for sunburn.  Patient instructed to avoid sunlight, if possible.  When exposed to sunlight, patients should wear protective clothing, sunglasses, and sunscreen.  The patient was instructed to call the office immediately if the following severe adverse effects occur:  hearing changes, easy bruising/bleeding, severe headache, or vision changes.  The patient verbalized understanding of the proper use and possible adverse effects of doxycycline.  All of the patient's questions and concerns were addressed. Topical Clindamycin Counseling: Patient counseled that this medication may cause skin irritation or allergic reactions.  In the event of skin irritation, the patient was advised to reduce the amount of the drug applied or use it less frequently.   The patient verbalized understanding of the proper use and possible adverse effects of clindamycin.  All of the patient's questions and concerns were addressed. clear Aklief counseling:  Patient advised to apply a pea-sized amount only at bedtime and wait 30 minutes after washing their face before applying.  If too drying, patient may add a non-comedogenic moisturizer.  The most commonly reported side effects including irritation, redness, scaling, dryness, stinging, burning, itching, and increased risk of sunburn.  The patient verbalized understanding of the proper use and possible adverse effects of retinoids.  All of the patient's questions and concerns were addressed. Azithromycin Pregnancy And Lactation Text: This medication is considered safe during pregnancy and is also secreted in breast milk.

## 2022-11-11 NOTE — OB RN DELIVERY SUMMARY - BABY A: VOID IN DELIVERY
Please keep taking metoprolol.      Triggers for arrhythmias:    Caffeine  Alcohol  Decongestant medicines (cold and sinus meds: phenylephrine, pseudoephedrine)  Stimulants  Lack of sleep  Stress and anxiety    =================    Flonase and Nasonex nose sprays do not contain a decongestant and are safe to use.   Afrin nose spray is a decongestant and you should avoid it.    ==============    Valsalva for SVT:    A Valsalva maneuver is a breathing technique that can stop certain types of rapid heart beats.  It works only about a 1/3rd of the time but it is worth practicing so that you can perform it effectively when needed.     Place one hand on the abdomen and push your stomach out while exhaling forcefully. Pinch your nose and blow the air out through your lips. Blowing through a large straw with your finger covering the end of the straw is an alternate way of performing a Valsalva.  Do this for 5-10 seconds while seated or laying down. If it does not work, you can repeat 3-4 times.  You can also use a large bore straw to do this.  Close the lower end with your finger and blow hard for a few seconds until you can feel the pressure build up in your ears.    Potassium and magnesium OTC       no

## 2022-12-20 NOTE — DISCHARGE NOTE OB - CARE PROVIDER_API CALL
Ani Mccann (MD), Obstetrics and Gynecology  [address] [place], [place] [address]  Phone: [phone]  Fax: [phone] 17-Dec-2022

## 2023-06-11 ENCOUNTER — EMERGENCY (EMERGENCY)
Facility: HOSPITAL | Age: 28
LOS: 1 days | Discharge: HOME | End: 2023-06-11
Attending: STUDENT IN AN ORGANIZED HEALTH CARE EDUCATION/TRAINING PROGRAM | Admitting: STUDENT IN AN ORGANIZED HEALTH CARE EDUCATION/TRAINING PROGRAM
Payer: MEDICAID

## 2023-06-11 VITALS
SYSTOLIC BLOOD PRESSURE: 106 MMHG | TEMPERATURE: 98 F | RESPIRATION RATE: 18 BRPM | HEART RATE: 76 BPM | DIASTOLIC BLOOD PRESSURE: 66 MMHG | OXYGEN SATURATION: 100 %

## 2023-06-11 VITALS
OXYGEN SATURATION: 100 % | RESPIRATION RATE: 16 BRPM | SYSTOLIC BLOOD PRESSURE: 119 MMHG | HEART RATE: 87 BPM | DIASTOLIC BLOOD PRESSURE: 89 MMHG | TEMPERATURE: 98 F

## 2023-06-11 LAB
ALBUMIN SERPL ELPH-MCNC: 4.5 G/DL — SIGNIFICANT CHANGE UP (ref 3.3–5)
ALP SERPL-CCNC: 67 U/L — SIGNIFICANT CHANGE UP (ref 40–120)
ALT FLD-CCNC: 9 U/L — SIGNIFICANT CHANGE UP (ref 4–33)
ANION GAP SERPL CALC-SCNC: 13 MMOL/L — SIGNIFICANT CHANGE UP (ref 7–14)
APPEARANCE UR: CLEAR — SIGNIFICANT CHANGE UP
AST SERPL-CCNC: 16 U/L — SIGNIFICANT CHANGE UP (ref 4–32)
BACTERIA # UR AUTO: ABNORMAL
BASOPHILS # BLD AUTO: 0.03 K/UL — SIGNIFICANT CHANGE UP (ref 0–0.2)
BASOPHILS NFR BLD AUTO: 0.2 % — SIGNIFICANT CHANGE UP (ref 0–2)
BILIRUB SERPL-MCNC: 0.8 MG/DL — SIGNIFICANT CHANGE UP (ref 0.2–1.2)
BILIRUB UR-MCNC: NEGATIVE — SIGNIFICANT CHANGE UP
BUN SERPL-MCNC: 14 MG/DL — SIGNIFICANT CHANGE UP (ref 7–23)
CALCIUM SERPL-MCNC: 9.4 MG/DL — SIGNIFICANT CHANGE UP (ref 8.4–10.5)
CHLORIDE SERPL-SCNC: 103 MMOL/L — SIGNIFICANT CHANGE UP (ref 98–107)
CO2 SERPL-SCNC: 23 MMOL/L — SIGNIFICANT CHANGE UP (ref 22–31)
COLOR SPEC: YELLOW — SIGNIFICANT CHANGE UP
CREAT SERPL-MCNC: 1.13 MG/DL — SIGNIFICANT CHANGE UP (ref 0.5–1.3)
DIFF PNL FLD: ABNORMAL
EGFR: 68 ML/MIN/1.73M2 — SIGNIFICANT CHANGE UP
EOSINOPHIL # BLD AUTO: 0.06 K/UL — SIGNIFICANT CHANGE UP (ref 0–0.5)
EOSINOPHIL NFR BLD AUTO: 0.5 % — SIGNIFICANT CHANGE UP (ref 0–6)
EPI CELLS # UR: 3 /HPF — SIGNIFICANT CHANGE UP (ref 0–5)
GLUCOSE SERPL-MCNC: 83 MG/DL — SIGNIFICANT CHANGE UP (ref 70–99)
GLUCOSE UR QL: NEGATIVE — SIGNIFICANT CHANGE UP
HCT VFR BLD CALC: 41.8 % — SIGNIFICANT CHANGE UP (ref 34.5–45)
HGB BLD-MCNC: 12.9 G/DL — SIGNIFICANT CHANGE UP (ref 11.5–15.5)
HYALINE CASTS # UR AUTO: 4 /LPF — SIGNIFICANT CHANGE UP (ref 0–7)
IANC: 9.69 K/UL — HIGH (ref 1.8–7.4)
IMM GRANULOCYTES NFR BLD AUTO: 0.4 % — SIGNIFICANT CHANGE UP (ref 0–0.9)
KETONES UR-MCNC: ABNORMAL
LEUKOCYTE ESTERASE UR-ACNC: ABNORMAL
LIDOCAIN IGE QN: 29 U/L — SIGNIFICANT CHANGE UP (ref 7–60)
LYMPHOCYTES # BLD AUTO: 18.7 % — SIGNIFICANT CHANGE UP (ref 13–44)
LYMPHOCYTES # BLD AUTO: 2.45 K/UL — SIGNIFICANT CHANGE UP (ref 1–3.3)
MCHC RBC-ENTMCNC: 24.4 PG — LOW (ref 27–34)
MCHC RBC-ENTMCNC: 30.9 GM/DL — LOW (ref 32–36)
MCV RBC AUTO: 79 FL — LOW (ref 80–100)
MONOCYTES # BLD AUTO: 0.81 K/UL — SIGNIFICANT CHANGE UP (ref 0–0.9)
MONOCYTES NFR BLD AUTO: 6.2 % — SIGNIFICANT CHANGE UP (ref 2–14)
NEUTROPHILS # BLD AUTO: 9.69 K/UL — HIGH (ref 1.8–7.4)
NEUTROPHILS NFR BLD AUTO: 74 % — SIGNIFICANT CHANGE UP (ref 43–77)
NITRITE UR-MCNC: NEGATIVE — SIGNIFICANT CHANGE UP
NRBC # BLD: 0 /100 WBCS — SIGNIFICANT CHANGE UP (ref 0–0)
NRBC # FLD: 0 K/UL — SIGNIFICANT CHANGE UP (ref 0–0)
PH UR: 6.5 — SIGNIFICANT CHANGE UP (ref 5–8)
PLATELET # BLD AUTO: 387 K/UL — SIGNIFICANT CHANGE UP (ref 150–400)
POTASSIUM SERPL-MCNC: 4 MMOL/L — SIGNIFICANT CHANGE UP (ref 3.5–5.3)
POTASSIUM SERPL-SCNC: 4 MMOL/L — SIGNIFICANT CHANGE UP (ref 3.5–5.3)
PROT SERPL-MCNC: 7.9 G/DL — SIGNIFICANT CHANGE UP (ref 6–8.3)
PROT UR-MCNC: ABNORMAL
RBC # BLD: 5.29 M/UL — HIGH (ref 3.8–5.2)
RBC # FLD: 14.6 % — HIGH (ref 10.3–14.5)
RBC CASTS # UR COMP ASSIST: 156 /HPF — HIGH (ref 0–4)
SODIUM SERPL-SCNC: 139 MMOL/L — SIGNIFICANT CHANGE UP (ref 135–145)
SP GR SPEC: 1.03 — SIGNIFICANT CHANGE UP (ref 1.01–1.05)
UROBILINOGEN FLD QL: ABNORMAL
WBC # BLD: 13.09 K/UL — HIGH (ref 3.8–10.5)
WBC # FLD AUTO: 13.09 K/UL — HIGH (ref 3.8–10.5)
WBC UR QL: 11 /HPF — HIGH (ref 0–5)

## 2023-06-11 PROCEDURE — 76705 ECHO EXAM OF ABDOMEN: CPT | Mod: 26

## 2023-06-11 PROCEDURE — 99284 EMERGENCY DEPT VISIT MOD MDM: CPT

## 2023-06-11 RX ORDER — SODIUM CHLORIDE 9 MG/ML
1000 INJECTION INTRAMUSCULAR; INTRAVENOUS; SUBCUTANEOUS ONCE
Refills: 0 | Status: COMPLETED | OUTPATIENT
Start: 2023-06-11 | End: 2023-06-11

## 2023-06-11 RX ORDER — MELOXICAM 15 MG/1
1 TABLET ORAL
Qty: 6 | Refills: 0
Start: 2023-06-11 | End: 2023-06-13

## 2023-06-11 RX ORDER — KETOROLAC TROMETHAMINE 30 MG/ML
15 SYRINGE (ML) INJECTION ONCE
Refills: 0 | Status: DISCONTINUED | OUTPATIENT
Start: 2023-06-11 | End: 2023-06-11

## 2023-06-11 RX ORDER — ONDANSETRON 8 MG/1
1 TABLET, FILM COATED ORAL
Qty: 9 | Refills: 0
Start: 2023-06-11 | End: 2023-06-13

## 2023-06-11 RX ORDER — FAMOTIDINE 10 MG/ML
1 INJECTION INTRAVENOUS
Qty: 5 | Refills: 0
Start: 2023-06-11 | End: 2023-06-15

## 2023-06-11 RX ADMIN — Medication 15 MILLIGRAM(S): at 14:47

## 2023-06-11 RX ADMIN — SODIUM CHLORIDE 1000 MILLILITER(S): 9 INJECTION INTRAMUSCULAR; INTRAVENOUS; SUBCUTANEOUS at 14:44

## 2023-06-11 NOTE — ED PROVIDER NOTE - NSFOLLOWUPINSTRUCTIONS_ED_ALL_ED_FT
Please return to Emergency Department immediately for any new, concerning, or worsening symptoms.   Please follow-up with GI and primary medical doctor as recommended.    Any results obtained today during your evaluation is attached and available in your portal. Please take all your results to follow up with your primary care doctor so that they can determine if you need any additional testing or treatment as an outpatient.

## 2023-06-11 NOTE — ED ADULT NURSE NOTE - NSFALLUNIVINTERV_ED_ALL_ED
What Type Of Note Output Would You Prefer (Optional)?: Standard Output
How Severe Are Your Spot(S)?: mild
Have Your Spot(S) Been Treated In The Past?: has not been treated
Hpi Title: Evaluation of Skin Lesions
Bed/Stretcher in lowest position, wheels locked, appropriate side rails in place/Call bell, personal items and telephone in reach/Instruct patient to call for assistance before getting out of bed/chair/stretcher/Non-slip footwear applied when patient is off stretcher/[place] to call system/Physically safe environment - no spills, clutter or unnecessary equipment/Purposeful proactive rounding/Room/bathroom lighting operational, light cord in reach

## 2023-06-11 NOTE — ED PROVIDER NOTE - PHYSICAL EXAMINATION
Gen: no acute distress, well appearing, awake, alert and oriented x 3  Cardiac: regular rate and rhythm, +S1S2  Pulm: Clear to auscultation bilaterally  Abd: soft, +ttp ruq, nondistended, no guarding  Back: neg CVA ttp, nontender spine  Extremity: no edema, no deformity, warm and well perfused, FROM all extremities    Neuro: awake, alert, oriented x 3, sensorimotor intact  Psych: normal affect

## 2023-06-11 NOTE — ED ADULT NURSE NOTE - SUICIDE SCREENING QUESTION 2
Patient's chart was reviewed.   Requested updates within Care Everywhere.  Immunizations reconciled.    Health Maintenance was updated.     No

## 2023-06-11 NOTE — ED ADULT NURSE NOTE - NS ED NOTE ABUSE RESPONSE YN
Yes [Formal Caregiver] : formal caregiver [Acute] : an acute visit [Family Member] : family member [Pre-Visit Preparation] : pre-visit preparation was done [Intercurrent Specialty/Sub-specialty Visits] : the patient has intercurrent specialty/sub-specialty visits

## 2023-06-11 NOTE — ED PROVIDER NOTE - OBJECTIVE STATEMENT
27-year-old female with no past medical history, no surgical history presents to ED for evaluation of right upper quadrant pain radiating to the right side back since yesterday.  Pain is constant, sharp.  Has never had similar pain in the past.  Associated with mild nausea and vomiting but holding down food.  Denies diarrhea, fever, urinary symptoms, vaginal symptoms, chest pain, shortness of breath.  Last bowel movement today, normal.  Denies sick contacts, recent travel, food triggers.  Menses ended yesterday with mild spotting today.  Denies smoking, illicit drug use.  Reports occasional alcohol use, no drinking prior to onset of symptoms.

## 2023-06-11 NOTE — ED ADULT TRIAGE NOTE - CHIEF COMPLAINT QUOTE
states" I am having stomach and back pain since last night with vomiting ". went to urgent care and was asked to come to ER

## 2023-06-11 NOTE — ED PROVIDER NOTE - PATIENT PORTAL LINK FT
You can access the FollowMyHealth Patient Portal offered by [facility] by registering at the following website: [email or web address]. By joining [employer]’s FollowMyHealth portal, you will also be able to view your health information using other applications (apps) compatible with our system.

## 2023-06-27 ENCOUNTER — APPOINTMENT (OUTPATIENT)
Dept: UROLOGY | Facility: CLINIC | Age: 28
End: 2023-06-27
Payer: MEDICAID

## 2023-06-27 ENCOUNTER — NON-APPOINTMENT (OUTPATIENT)
Age: 28
End: 2023-06-27

## 2023-06-27 VITALS
TEMPERATURE: 97.2 F | OXYGEN SATURATION: 96 % | BODY MASS INDEX: 29.81 KG/M2 | WEIGHT: 142 LBS | HEIGHT: 58 IN | SYSTOLIC BLOOD PRESSURE: 91 MMHG | HEART RATE: 87 BPM | DIASTOLIC BLOOD PRESSURE: 67 MMHG

## 2023-06-27 PROCEDURE — 99204 OFFICE O/P NEW MOD 45 MIN: CPT

## 2023-06-27 NOTE — PHYSICAL EXAM
[Normal Appearance] : normal appearance [Edema] : no peripheral edema [Exaggerated Use Of Accessory Muscles For Inspiration] : no accessory muscle use [Abdomen Tenderness] : non-tender [Costovertebral Angle Tenderness] : no ~M costovertebral angle tenderness

## 2023-06-27 NOTE — ASSESSMENT
[FreeTextEntry1] : 27 y.o. F with right ureteral stone\par - Renal US from hospital reviewed\par - Labs from hospital reviewed\par - CT report reviewed\par \par We discussed the risks, benefits, and alternatives for her ureteral stone management, including watchful waiting (with or without medical expulsive therapy) and surgical intervention. \par \par Watchful waiting: \par This is an acceptable initial approach for ureteral stones <10 mm if the stone can be expected to pass spontaneously within a reasonable time and the pain is tolerable without evidence of infection or renal compromise. I explained that there is a strong correlation between stone size and location and the likelihood for spontaneous stone passage. In general, 68% of stones <5 mm and 47% of stones >5 mm will pass spontaneously over a mean time of approximately 2 weeks. This rate for upper, mid, and distal ureteral stones is approximately 50%, 60% and 70%, respectively. While many advocate intervention if the stone fails to pass within 4 weeks of the start of symptoms, there is evidence of spontaneous passage rates up to 98% at 6 weeks for stones of <5 mm. Therefore, longer follow-up (up to 6 weeks) may be warranted, particularly for smaller stones. \par \par * Medical expulsive therapy (MET) may be added to watchful waiting as it is thought to expedite stone expulsion. Traditionally, off-label alpha-blocker therapy is prescribed based on prior trials supporting their use in distal stones >5mm. However, I explained that this represents an increasingly controversial topic due to the contradictory results of more contemporary high-quality studies, the number of which outweigh those demonstrating a therapeutic benefit. At the same time, the risks of short-term alpha-blocker therapy are low and therefore this may be an option for well-informed, select patients. \par \par Shock Wave Lithotripsy (SWL): \par This is the least invasive form of surgery for stones and an excellent option for select stones. For ureteral stones, SWL is limited to the upper ureter. I explained how the procedure is performed and the concept behind shock waves. Procedural success is dependent on several stone and environmental factors such as the stone composition or density on CT, the presence or absence of hydronephrosis, size of the stone, stone location, and skin-to-stone distance on CT scan (patient’s body habitus). For these reasons, not all stones/patients are good candidates for SWL. The chances of being stone free after SWL are often much lower compared to other modalities, such as ureteroscopy, except in select cases where stone-free rates are comparable. Therefore, there is a risk that the patient would need subsequent procedures to render them stone-free. Since this is a non-invasive procedure, we are relying on the kidney to spontaneously pass the resultant stone fragments. At the same time, this procedure does carry some perioperative risks, mainly bleeding and infection, as well as the small risk of developing obstruction due to passage of stone fragments, which could require urgent placement of a double-J ureteral stent or nephrostomy tube. \par  \par Ureteroscopy: \par I explained the technique in detail and how it is performed. Though more invasive than SWL, high stone free rates (approaching 90% for ureteral stones; 60% for renal stones) have made it increasingly popular among physicians and patients. Very commonly, a ureteral stent is left in place at the conclusion of the procedure, but only if needed. I explained that if a stent is placed, it would need to be removed either cystoscopically under local anesthesia or it may have a string left externally through the urethra for removal in a couple of days after the procedure. Risks of ureteroscopy include, but are not limited to, bleeding, infection, injury to the bladder or ureter, ureteral perforation, ureteral stricture, and other risks involved with general anesthesia. There is also the risk that the procedure needs to be staged into more than one session based on the patient's internal anatomy and the size of the stone(s). Finally, dilation of the ureter and/or ureteral stent placement prior to definitive ureteroscopy may be necessary to achieve ureteral access safely. \par \par I explained all these options to the patient and my assessment of the risks and benefits of each approach. \par \par I have answered all the patient’s questions and he has elected to undergo a period of watchful waiting to attempt spontaneous stone passage. Strict precautions to contact our team were given for intractable pain, inability to tolerate oral intake, fevers/chills concerning for infection, or any other concerns. \par \par - Flomax, percocet prescribed\par - Strain urine\par - TTM 2-3 weeks\par - Discussed if still with pain, will plan for URS\par - US prior to appointment

## 2023-06-27 NOTE — HISTORY OF PRESENT ILLNESS
[FreeTextEntry1] : 27-year-old female presents to the emergency room follow-up.  To review, she was seen at [facility] on June 11 with abdominal pain.\par \par Abdominal ultrasound demonstrated mild right hydronephrosis.\par Urinalysis with 156 red blood cells per high-power field, 11 white blood cells, leukocyte Estrace.  Urine culture negative\par Creatinine 1.13\par WC count: 13.1\par \par Went to PCP next day. Had a CT performed June 19 which demonstrated a 5mm RIGHT distal stone, as well as b/l non obstructing stones (R>L).\par \par Never had stones as far as she knows\par \par Still with intermittent pain. No fevers, chills, nausea, emesis.

## 2023-07-12 NOTE — PATIENT PROFILE OB - SKIN TO SKIN
Show Topical Anesthesia Variable?: Yes Render Post-Care Instructions In Note?: no Medical Necessity Information: It is in your best interest to select a reason for this procedure from the list below. All of these items fulfill various CMS LCD requirements except the new and changing color options. Consent: The patient's consent was obtained including but not limited to risks of crusting, scabbing, blistering, scarring, darker or lighter pigmentary change, recurrence, incomplete removal and infection. Medical Necessity Clause: This procedure was medically necessary because the lesions that were treated were: Post-Care Instructions: I reviewed with the patient in detail post-care instructions. Patient is to wear sunprotection, and avoid picking at any of the treated lesions. Pt may apply Vaseline to crusted or scabbing areas. Detail Level: Detailed Spray Paint Text: The liquid nitrogen was applied to the skin utilizing a spray paint frosting technique. yes

## 2023-07-25 ENCOUNTER — OUTPATIENT (OUTPATIENT)
Dept: OUTPATIENT SERVICES | Facility: HOSPITAL | Age: 28
LOS: 1 days | End: 2023-07-25
Payer: MEDICAID

## 2023-07-25 ENCOUNTER — APPOINTMENT (OUTPATIENT)
Dept: ULTRASOUND IMAGING | Facility: CLINIC | Age: 28
End: 2023-07-25
Payer: MEDICAID

## 2023-07-25 DIAGNOSIS — N20.1 CALCULUS OF URETER: ICD-10-CM

## 2023-07-25 PROCEDURE — 76775 US EXAM ABDO BACK WALL LIM: CPT | Mod: 26

## 2023-07-25 PROCEDURE — 76775 US EXAM ABDO BACK WALL LIM: CPT

## 2023-07-26 ENCOUNTER — APPOINTMENT (OUTPATIENT)
Dept: UROLOGY | Facility: CLINIC | Age: 28
End: 2023-07-26
Payer: MEDICAID

## 2023-07-26 DIAGNOSIS — N20.0 CALCULUS OF KIDNEY: ICD-10-CM

## 2023-07-26 DIAGNOSIS — N20.1 CALCULUS OF URETER: ICD-10-CM

## 2023-07-26 PROCEDURE — 99442: CPT

## 2023-07-26 NOTE — ASSESSMENT
[FreeTextEntry1] : 27-year-old female with a right ureteral stone, left non obstructing stone\par - Pain resolved.  Repeat ultrasound with resolved hydronephrosis.  Discussed that while ultrasound is not 100% sensitive, based on the reassuring ultrasound and absence of pain, it is very likely she passed her stone\par - Has nonobstructing left-sided stone.  Discussed options for this, including ESWL versus ureteroscopy versus observation.  She would prefer observation.\par - Regarding stone prevention, we discussed general stone preventative recommendations, such as increasing hydration to target a urine output of 2-2.5 liters per day, adding citrus to the diet, moderation of salt and animal protein.  I also discussed the 24-hour urine.  She is young and has bilateral stones.  She would like to forego this for now.  I will see her back in approximate 1 year with a renal ultrasound to evaluate for stones\par \par Telehealth Consultation: 10 minutes - 5 minutes reviewing history and discussing prior results. 5 minutes discussing various treatment options and writing note.

## 2023-07-26 NOTE — HISTORY OF PRESENT ILLNESS
[FreeTextEntry1] : This telephonic visit was provided via audio only technology. The patient, was located at [facility], at the time of the visit. \par The provider, Stanford Anderson, was located at [place], [place] at the time of the visit. The patient and Provider participated in the telephonic visit. \par Verbal consent for telephonic services was given on 7/26/2023 by the patient. \par \par The patient-doctor relationship has been established in a face to face fashion via real time video/audio HIPAA compliant communication using telemedicine software. The patient's identity has been confirmed. The patient was previously emailed a copy of the telemedicine consent. They have had a chance to review and has now given verbal consent and has requested care to be assessed and treated via telemedicine. They understand there may be limitations in this process, and that they may need further followup care in the office and/or hospital settings. \par \par 27-year-old female who presents for follow-up.\par \par Recently seen at [facility] with abdominal pain.  CT scan with 5 mm right distal ureteral stone.  Has been on medical expulsive therapy\par \par Has not had pain for the past 2 weeks.  No fever, chills, nausea, emesis.  Did not pass a stone, although has not been straining her urine regularly.

## 2023-11-27 NOTE — OB RN PATIENT PROFILE - NS PRO TDAP PRECAUTIONS
Caller: Patient     Doctor: Nitin Mcclain    Reason for call: Patient is very upset that she needs to reschedule her appointment. She has been waiting a very long to be seen and is requesting an appointment on around the same time it was originally scheduled. She will travel to any office if necessary. Please call her back to schedule.      Call back#: [phone] moderate or severe acute illness with or without fever

## 2024-01-25 ENCOUNTER — EMERGENCY (EMERGENCY)
Facility: HOSPITAL | Age: 29
LOS: 1 days | Discharge: HOME | End: 2024-01-25
Attending: EMERGENCY MEDICINE | Admitting: STUDENT IN AN ORGANIZED HEALTH CARE EDUCATION/TRAINING PROGRAM
Payer: MEDICAID

## 2024-01-25 VITALS
HEART RATE: 83 BPM | SYSTOLIC BLOOD PRESSURE: 108 MMHG | RESPIRATION RATE: 16 BRPM | DIASTOLIC BLOOD PRESSURE: 77 MMHG | TEMPERATURE: 99 F | OXYGEN SATURATION: 100 %

## 2024-01-25 PROCEDURE — 93010 ELECTROCARDIOGRAM REPORT: CPT

## 2024-01-25 PROCEDURE — 71046 X-RAY EXAM CHEST 2 VIEWS: CPT | Mod: 26

## 2024-01-25 PROCEDURE — 99284 EMERGENCY DEPT VISIT MOD MDM: CPT

## 2024-01-25 NOTE — ED ADULT NURSE NOTE - OBJECTIVE STATEMENT
pt received to [facility] and ambulatory at baseline, c/o SOB with associated midsternal chest pain and back mid to upper back pain x 2 weeks. pt reports having a cough for the same time, symptoms are exacerbated by coughing, laying down, and on exertion. pt reports going to urgent care 3 days ago, was tested for viral illnesses and results were negative. pt also endorsing mild generalized abdominal pain, chills, body aches. denies fevers, n/v/d, recent travel, known sick contacts. skin is dry and warm, coloring appropriate. no acute distress noted at this time. respirations even and nonlabored on room air. comfort and safety maintained. 20G IV placed in LAC, labs drawn and sent. pt pending XR.

## 2024-01-25 NOTE — ED PROVIDER NOTE - PROGRESS NOTE DETAILS
MD Silverio: Pt signed out to me pending labs results. Labs are unactionable. Flu/covid swab pending. Pt was re-evaluated at bedside, VSS, feeling better overall, tolerating PO. Results were discussed with patient as well as return precautions and follow up plan with PCP. Time was taken to answer any questions that the patient had before providing them with discharge paperwork.

## 2024-01-25 NOTE — ED ADULT NURSE NOTE - NSFALLUNIVINTERV_ED_ALL_ED
Bed/Stretcher in lowest position, wheels locked, appropriate side rails in place/Call bell, personal items and telephone in reach/Instruct patient to call for assistance before getting out of bed/chair/stretcher/Non-slip footwear applied when patient is off stretcher/[place] to call system/Physically safe environment - no spills, clutter or unnecessary equipment/Purposeful proactive rounding/Room/bathroom lighting operational, light cord in reach

## 2024-01-25 NOTE — ED ADULT TRIAGE NOTE - CHIEF COMPLAINT QUOTE
Pt c/o rt sided chest pain radiating to back x 2 months worse when lying down and coughing. Endorsing cough x 2-3 weeks. Endorsing SOB, respirations even and unlabored. Denies phx.

## 2024-01-25 NOTE — ED PROVIDER NOTE - PATIENT PORTAL LINK FT
You can access the FollowMyHealth Patient Portal offered by [facility] by registering at the following website: [email or web address]. By joining [facility]’s FollowMyHealth portal, you will also be able to view your health information using other applications (apps) compatible with our system.

## 2024-01-25 NOTE — ED PROVIDER NOTE - CLINICAL SUMMARY MEDICAL DECISION MAKING FREE TEXT BOX
Will repeat chest xray, check labs including cardiac enzymes and pro-BNP since the pt describes both chest pain and dyspnea. Will also re-swab and provide supportive care to be discharged if improved.

## 2024-01-25 NOTE — ED PROVIDER NOTE - OBJECTIVE STATEMENT
29 yo F with no PMHx here with cough, nasal congestion for the past 2 months with associated central chest pain/back pain for the past few weeks that has been exacerbated by coughing. The pt has been to the urgent care twice and to her pmd who performed two xrays- both negative and the pt had a negative flu/covid swab.The pt says that she has been having shortness of breath and feels winded after she climbs a flight of stairs.

## 2024-01-26 VITALS
TEMPERATURE: 99 F | RESPIRATION RATE: 18 BRPM | DIASTOLIC BLOOD PRESSURE: 74 MMHG | HEART RATE: 85 BPM | SYSTOLIC BLOOD PRESSURE: 112 MMHG | OXYGEN SATURATION: 100 %

## 2024-01-26 LAB
ALBUMIN SERPL ELPH-MCNC: 3.3 G/DL — SIGNIFICANT CHANGE UP (ref 3.3–5)
ALP SERPL-CCNC: 61 U/L — SIGNIFICANT CHANGE UP (ref 40–120)
ALT FLD-CCNC: 21 U/L — SIGNIFICANT CHANGE UP (ref 4–33)
ANION GAP SERPL CALC-SCNC: 11 MMOL/L — SIGNIFICANT CHANGE UP (ref 7–14)
ANISOCYTOSIS BLD QL: SLIGHT — SIGNIFICANT CHANGE UP
AST SERPL-CCNC: 26 U/L — SIGNIFICANT CHANGE UP (ref 4–32)
BASOPHILS # BLD AUTO: 0 K/UL — SIGNIFICANT CHANGE UP (ref 0–0.2)
BASOPHILS NFR BLD AUTO: 0 % — SIGNIFICANT CHANGE UP (ref 0–2)
BILIRUB SERPL-MCNC: 0.2 MG/DL — SIGNIFICANT CHANGE UP (ref 0.2–1.2)
BUN SERPL-MCNC: 6 MG/DL — LOW (ref 7–23)
CALCIUM SERPL-MCNC: 7.8 MG/DL — LOW (ref 8.4–10.5)
CHLORIDE SERPL-SCNC: 105 MMOL/L — SIGNIFICANT CHANGE UP (ref 98–107)
CO2 SERPL-SCNC: 26 MMOL/L — SIGNIFICANT CHANGE UP (ref 22–31)
CREAT SERPL-MCNC: 0.67 MG/DL — SIGNIFICANT CHANGE UP (ref 0.5–1.3)
EGFR: 121 ML/MIN/1.73M2 — SIGNIFICANT CHANGE UP
EOSINOPHIL # BLD AUTO: 0.14 K/UL — SIGNIFICANT CHANGE UP (ref 0–0.5)
EOSINOPHIL NFR BLD AUTO: 2.6 % — SIGNIFICANT CHANGE UP (ref 0–6)
FLUAV AG NPH QL: SIGNIFICANT CHANGE UP
FLUBV AG NPH QL: DETECTED
GIANT PLATELETS BLD QL SMEAR: PRESENT — SIGNIFICANT CHANGE UP
GLUCOSE SERPL-MCNC: 98 MG/DL — SIGNIFICANT CHANGE UP (ref 70–99)
HCT VFR BLD CALC: 36.7 % — SIGNIFICANT CHANGE UP (ref 34.5–45)
HGB BLD-MCNC: 10.9 G/DL — LOW (ref 11.5–15.5)
IANC: 2.2 K/UL — SIGNIFICANT CHANGE UP (ref 1.8–7.4)
LYMPHOCYTES # BLD AUTO: 2.38 K/UL — SIGNIFICANT CHANGE UP (ref 1–3.3)
LYMPHOCYTES # BLD AUTO: 45.6 % — HIGH (ref 13–44)
MCHC RBC-ENTMCNC: 22.5 PG — LOW (ref 27–34)
MCHC RBC-ENTMCNC: 29.7 GM/DL — LOW (ref 32–36)
MCV RBC AUTO: 75.8 FL — LOW (ref 80–100)
MICROCYTES BLD QL: SLIGHT — SIGNIFICANT CHANGE UP
MONOCYTES # BLD AUTO: 0.18 K/UL — SIGNIFICANT CHANGE UP (ref 0–0.9)
MONOCYTES NFR BLD AUTO: 3.5 % — SIGNIFICANT CHANGE UP (ref 2–14)
NEUTROPHILS # BLD AUTO: 2.39 K/UL — SIGNIFICANT CHANGE UP (ref 1.8–7.4)
NEUTROPHILS NFR BLD AUTO: 39.5 % — LOW (ref 43–77)
NEUTS BAND # BLD: 6.2 % — HIGH (ref 0–6)
NT-PROBNP SERPL-SCNC: <36 PG/ML — SIGNIFICANT CHANGE UP
PLAT MORPH BLD: NORMAL — SIGNIFICANT CHANGE UP
PLATELET # BLD AUTO: 271 K/UL — SIGNIFICANT CHANGE UP (ref 150–400)
PLATELET COUNT - ESTIMATE: NORMAL — SIGNIFICANT CHANGE UP
POTASSIUM SERPL-MCNC: 3.8 MMOL/L — SIGNIFICANT CHANGE UP (ref 3.5–5.3)
POTASSIUM SERPL-SCNC: 3.8 MMOL/L — SIGNIFICANT CHANGE UP (ref 3.5–5.3)
PROT SERPL-MCNC: 6.5 G/DL — SIGNIFICANT CHANGE UP (ref 6–8.3)
RBC # BLD: 4.84 M/UL — SIGNIFICANT CHANGE UP (ref 3.8–5.2)
RBC # FLD: 15.3 % — HIGH (ref 10.3–14.5)
RBC BLD AUTO: ABNORMAL
RSV RNA NPH QL NAA+NON-PROBE: SIGNIFICANT CHANGE UP
SARS-COV-2 RNA SPEC QL NAA+PROBE: SIGNIFICANT CHANGE UP
SMUDGE CELLS # BLD: PRESENT — SIGNIFICANT CHANGE UP
SODIUM SERPL-SCNC: 142 MMOL/L — SIGNIFICANT CHANGE UP (ref 135–145)
TROPONIN T, HIGH SENSITIVITY RESULT: <6 NG/L — SIGNIFICANT CHANGE UP
VARIANT LYMPHS # BLD: 2.6 % — SIGNIFICANT CHANGE UP (ref 0–6)
WBC # BLD: 5.22 K/UL — SIGNIFICANT CHANGE UP (ref 3.8–10.5)
WBC # FLD AUTO: 5.22 K/UL — SIGNIFICANT CHANGE UP (ref 3.8–10.5)

## 2024-04-08 NOTE — OB RN DELIVERY SUMMARY - NSCORDSECONDSA_OBGYN_A_OB_FT

## 2024-05-06 ENCOUNTER — APPOINTMENT (OUTPATIENT)
Dept: PULMONOLOGY | Facility: CLINIC | Age: 29
End: 2024-05-06
Payer: MEDICAID

## 2024-05-06 VITALS
DIASTOLIC BLOOD PRESSURE: 68 MMHG | WEIGHT: 160 LBS | HEART RATE: 85 BPM | SYSTOLIC BLOOD PRESSURE: 110 MMHG | TEMPERATURE: 96.8 F | HEIGHT: 58 IN | OXYGEN SATURATION: 96 % | BODY MASS INDEX: 33.58 KG/M2 | RESPIRATION RATE: 16 BRPM

## 2024-05-06 DIAGNOSIS — Z87.898 PERSONAL HISTORY OF OTHER SPECIFIED CONDITIONS: ICD-10-CM

## 2024-05-06 DIAGNOSIS — E28.2 POLYCYSTIC OVARIAN SYNDROME: ICD-10-CM

## 2024-05-06 DIAGNOSIS — G47.33 OBSTRUCTIVE SLEEP APNEA (ADULT) (PEDIATRIC): ICD-10-CM

## 2024-05-06 DIAGNOSIS — J45.30 MILD PERSISTENT ASTHMA, UNCOMPLICATED: ICD-10-CM

## 2024-05-06 DIAGNOSIS — Z83.3 FAMILY HISTORY OF DIABETES MELLITUS: ICD-10-CM

## 2024-05-06 DIAGNOSIS — U07.1 COVID-19: ICD-10-CM

## 2024-05-06 DIAGNOSIS — R06.83 SNORING: ICD-10-CM

## 2024-05-06 DIAGNOSIS — Z86.16 PERSONAL HISTORY OF COVID-19: ICD-10-CM

## 2024-05-06 DIAGNOSIS — O09.92 SUPERVISION OF HIGH RISK PREGNANCY, UNSPECIFIED, SECOND TRIMESTER: ICD-10-CM

## 2024-05-06 DIAGNOSIS — Z78.9 OTHER SPECIFIED HEALTH STATUS: ICD-10-CM

## 2024-05-06 DIAGNOSIS — Z83.49 FAMILY HISTORY OF OTHER ENDOCRINE, NUTRITIONAL AND METABOLIC DISEASES: ICD-10-CM

## 2024-05-06 DIAGNOSIS — Z82.49 FAMILY HISTORY OF ISCHEMIC HEART DISEASE AND OTHER DISEASES OF THE CIRCULATORY SYSTEM: ICD-10-CM

## 2024-05-06 DIAGNOSIS — J30.2 OTHER SEASONAL ALLERGIC RHINITIS: ICD-10-CM

## 2024-05-06 DIAGNOSIS — E66.3 OVERWEIGHT: ICD-10-CM

## 2024-05-06 DIAGNOSIS — R06.02 SHORTNESS OF BREATH: ICD-10-CM

## 2024-05-06 DIAGNOSIS — R94.2 ABNORMAL RESULTS OF PULMONARY FUNCTION STUDIES: ICD-10-CM

## 2024-05-06 PROCEDURE — 71046 X-RAY EXAM CHEST 2 VIEWS: CPT

## 2024-05-06 PROCEDURE — ZZZZZ: CPT

## 2024-05-06 PROCEDURE — 94729 DIFFUSING CAPACITY: CPT

## 2024-05-06 PROCEDURE — 94618 PULMONARY STRESS TESTING: CPT

## 2024-05-06 PROCEDURE — 95012 NITRIC OXIDE EXP GAS DETER: CPT

## 2024-05-06 PROCEDURE — 94060 EVALUATION OF WHEEZING: CPT

## 2024-05-06 PROCEDURE — 94727 GAS DIL/WSHOT DETER LNG VOL: CPT

## 2024-05-06 PROCEDURE — 99204 OFFICE O/P NEW MOD 45 MIN: CPT | Mod: 25

## 2024-05-06 RX ORDER — ALBUTEROL SULFATE AND BUDESONIDE 90; 80 UG/1; UG/1
90-80 AEROSOL, METERED RESPIRATORY (INHALATION)
Qty: 1 | Refills: 3 | Status: ACTIVE | COMMUNITY
Start: 2024-05-06 | End: 1900-01-01

## 2024-05-06 RX ORDER — OLOPATADINE HYDROCHLORIDE 2 MG/ML
0.2 SOLUTION OPHTHALMIC
Qty: 1 | Refills: 2 | Status: ACTIVE | COMMUNITY
Start: 2024-05-06 | End: 1900-01-01

## 2024-05-06 RX ORDER — LEVOCETIRIZINE DIHYDROCHLORIDE 5 MG/1
5 TABLET ORAL
Qty: 1 | Refills: 1 | Status: ACTIVE | COMMUNITY
Start: 2024-05-06 | End: 1900-01-01

## 2024-05-06 RX ORDER — ALPRAZOLAM 0.5 MG/1
0.5 TABLET ORAL
Refills: 0 | Status: ACTIVE | COMMUNITY

## 2024-05-06 RX ORDER — OXYCODONE AND ACETAMINOPHEN 5; 325 MG/1; MG/1
5-325 TABLET ORAL
Qty: 10 | Refills: 0 | Status: DISCONTINUED | COMMUNITY
Start: 2023-06-27 | End: 2024-05-06

## 2024-05-06 RX ORDER — BUDESONIDE AND FORMOTEROL FUMARATE DIHYDRATE 160; 4.5 UG/1; UG/1
160-4.5 AEROSOL RESPIRATORY (INHALATION)
Refills: 0 | Status: ACTIVE | COMMUNITY

## 2024-05-06 RX ORDER — ALBUTEROL SULFATE 90 UG/1
108 (90 BASE) INHALANT RESPIRATORY (INHALATION)
Refills: 0 | Status: ACTIVE | COMMUNITY

## 2024-05-06 RX ORDER — OLOPATADINE HYDROCHLORIDE 665 UG/1
0.6 SPRAY, METERED NASAL
Qty: 1 | Refills: 3 | Status: ACTIVE | COMMUNITY
Start: 2024-05-06 | End: 1900-01-01

## 2024-05-06 RX ORDER — TAMSULOSIN HYDROCHLORIDE 0.4 MG/1
0.4 CAPSULE ORAL
Qty: 30 | Refills: 3 | Status: DISCONTINUED | COMMUNITY
Start: 2023-06-27 | End: 2024-05-06

## 2024-05-06 RX ORDER — GLUC/MSM/COLGN2/HYAL/ANTIARTH3 375-375-20
TABLET ORAL
Refills: 0 | Status: ACTIVE | COMMUNITY

## 2024-05-06 RX ORDER — PNV NO.95/FERROUS FUM/FOLIC AC 28MG-0.8MG
TABLET ORAL
Refills: 0 | Status: ACTIVE | COMMUNITY

## 2024-05-06 NOTE — PROCEDURE
[FreeTextEntry1] : PFT (3/1/2024) reveals normal flows, with an FEV1 of 1.73 L, which is 70% of predicted, with a Abnormal inspiratory limb.   Blood Work (4/1/2024): CBC (4/1/2024) revealed WBC 18.1, HGB 12.5, HCT 42, ,  ESR 2 CMP normal Iron Level 59 Ferritin 5 Iron Saturation 11% CRP 13.8  CXR 05/06/2024 reveals a normal sized heart; no evidence of infiltrate or effusion -- a normal appearing chest radiograph  Full PFT reveals mild restrictive dysfunction; FEV1 was 1.84 L which is 74% of predicted with a 18% improvement at mid to low volumes after using a Bronchodilator; low normal lung volumes; moderately reduced diffusion at 12.3, which is 56% of predicted; Abnormal inspiratory limb. PFTs were performed to evaluate for SOB  6 minute walk test reveals a low saturation of 95% with no evidence of dyspnea or fatigue; walked 326 meters  FENO was 76; a normal value being less than 25 Fractional exhaled nitric oxide (FENO) is regarded as a simple, noninvasive method for assessing eosinophilic airway inflammation. Produced by a variety of cells within the lung, nitric oxide (NO) concentrations are generally low in healthy individuals. However, high concentrations of NO appear to be involved in nonspecific host defense mechanisms and chronic inflammatory diseases such as asthma. The American Thoracic Society (ATS) therefore has strongly recommended using FENO to aid in the assessment, management, and long-term monitoring of eosinophilic airway inflammation and asthma, and for identifying steroid responsive individuals whose chronic respiratory symptoms may be caused by airway inflammation. In their 2011 clinical practice guideline, the ATS emphasizes the importance of using FENO.

## 2024-05-06 NOTE — ASSESSMENT
[FreeTextEntry1] : Ms. KATE  is a 28 year old female with a history of Covid-19 (12/2020), kidney stones, uretal stone, anxiety, PCOS, seasonal allergies who now comes to the office for an initial pulmonary evaluation for SOB, likely New Onset Asthma, Seasonal Allergies, DAHIANA   Her shortness of breath is multifactorial due to: -poor mechanics of breathing -Anxiety -out of shape -over weight -pulmonary disease   -likely New Onset Asthma (Risk factors: seasonal allergies, Covid, mild overweight) -cardiac    -Fe Anemia (mild) - (On Iron)   problem 1: likely New Onset Asthma -add Symbicort 160 2 inhalations BID -initiate Airsupra 2 puffs q6H prn -check Alpha 1 Antitrypsin level  -Inhaler technique reviewed as well as oral hygiene techniques reviewed with patient. Avoidance of cold air, extremes of temperature, rescue inhaler should be used before exercise. Order of medication reviewed with patient. Recommended use of a cool mist humidifier in the bedroom. - Asthma is believed to be caused by inherited (genetic) and environmental factor, but its exact cause is unknown.  - Asthma may be triggered by allergens, lung infections, or irritants in the air. Asthma triggers are different for each person  problem 1A: Abnormal PFTs -Restrictive Dysfunction -Moderately reduced diffusion (f/p study next visit)   problem 2: Allergies / Sinus -get blood work to include: asthma panel, food IgE panel, IgE level, eosinophil level, vitamin D level -add Xyzal 5 mg QHS -add Olopatadine 0.2% eye drops BID -add Olopatadine 0.6% 1 sniff BID Environmental measures for allergies were encouraged including mattress and pillow covers, air purifier, and environmental controls.   Problem 3: DAHIANA (Risk factors: elevated Mallampati class, snoring, fatigue) -set up DD (El) -Sleep apnea is associated with adverse clinical consequences which can affect most organ systems. Cardiovascular disease risk includes arrhythmias, atrial fibrillation, hypertension, coronary artery disease, and stroke. Metabolic disorders include diabetes type 2, non-alcoholic fatty liver disease. Mood disorder especially depression; and cognitive decline especially in the elderly. Associations with chronic reflux/Marc's esophagus some but now all inclusive. -Reasons include arousal consistent with hypopnea; respiratory events most prominent in REM sleep or supine position; therefore sleep staging and body position are important for accurate diagnosis and estimation of AHI.   Problem 4: cardiac  -Review Echo  -recommended to continue to follow up with Cardiologist ([facility])   problem 5: poor breathing mechanics (Anxiety) -Proper breathing techniques were reviewed with an emphasis of exhalation. Patient instructed to breath in for 1 second and out for four seconds. Patient was encouraged to not talk while walking.   problem 6: overweight / out of shape -recommended Berberine Synergy OTC -Weight loss, exercise, and diet control were discussed and are highly encouraged. Treatment options are given such as, aqua therapy, and contacting a nutritionist. Recommended to use the elliptical, stationary bike, less use of treadmill.   problem 7: health maintenance -recommended yearly flu shot after October 15 -recommended strep pneumonia vaccines: Prevnar-20 vaccine, followed by Pneumo vaccine 23 one year following after 65 years old. -recommended early intervention for Upper Respiratory Infections (URIs) -recommended regular osteoporosis evaluations -recommended early dermatological evaluations -recommended after the age of 50 to the age of 70, colonoscopy every 5 years   F/U in 6-8 weeks. She is encouraged to call with any changes, concerns, or questions

## 2024-05-06 NOTE — ADDENDUM
[FreeTextEntry1] : Documented by Tyler Linares acting as a scribe for Dr. Drake Morris on 05/06/2024.   All medical record entries made by the Scribe were at my, Dr. Drake Morris's, direction and personally dictated by me on 05/06/2024. I have reviewed the chart and agree that the record accurately reflects my personal performance of the history, physical exam, assessment and plan. I have also personally directed, reviewed, and agree with the discharge instructions.

## 2024-05-06 NOTE — HISTORY OF PRESENT ILLNESS
[TextBox_4] : Ms. KATE  is a 28 year old female with a history of presenting to the office today for an initial pulmonary evaluation. Her chief complaint is   - she notes around 12/30/2023, she experienced SOB - she notes for 4 months, she went to Urgent Care, Cardiologist, and the ER for episodes of SOB - she notes feeling constantly sick over the 4-month period she was SOB with wheezing, sinus congestion, and chest heaviness/pressue - she notes having seasonal allergies which are the worst in the Spring and brings on itchy eyes, watery eyes, sneezing - she notes her allergies have gotten worse over the past few years - she denies any coughing amidst her allergy symptoms  - she notes over the 4 months she felt sick, she would experience some coughing - she notes her breathing was made worse with exertion  - she notes she was feeling more fatigued than her normal and had to sleep more - she notes from 12/2023 to 4/2024, she would need 2-3 hour naps due to her increased fatigue - she notes over this period, she had increased difficulty breathing when supine and needed to sleep propped up on a few pillows - she notes always having lower back pain - she notes getting muscle cramps in her legs that occur sporadically - she notes drinking 3-4 Claire [place] bottles a day - she notes snoring - she notes she could fall asleep while watching a boring TV show - she notes she could fall asleep in the car in a ride over an hour - she denies any nocturia - she notes she has gained weight over the past few months - she notes her menstrual cycle is normal, but heavy which has been the case for some time - she notes her Sx are not made worse during her menstrual cycle - she notes her balance is good - she notes her hearing is good - he notes his vision is stable - she notes her blood work has been showing some sort of infection in relation to her white blood cell count over these past 4 months - she notes taking Iron and B12 - she notes getting some relief in her breathing when taking Symbicort but not with Albuterol - she notes having a sleep study done within the last 2-3 years which revealed mild sleep apnea   -she denies any headaches, nausea, emesis, fever, chills, sweats, palpitations, constipation, diarrhea, vertigo, dysphagia, heartburn, reflux, itchy ears, leg swelling, leg pain, or sour taste in the mouth.

## 2024-05-06 NOTE — REASON FOR VISIT
[Initial] : an initial visit [TextBox_44] : SOB, likely New Onset Asthma, Seasonal Allergies, DAHIANA

## 2024-05-06 NOTE — PHYSICAL EXAM
[No Acute Distress] : no acute distress [Normal Oropharynx] : normal oropharynx [Normal Appearance] : normal appearance [No Neck Mass] : no neck mass [Normal Rate/Rhythm] : normal rate/rhythm [Normal S1, S2] : normal s1, s2 [No Murmurs] : no murmurs [No Resp Distress] : no resp distress [Clear to Auscultation Bilaterally] : clear to auscultation bilaterally [No Abnormalities] : no abnormalities [Benign] : benign [Normal Gait] : normal gait [No Clubbing] : no clubbing [No Cyanosis] : no cyanosis [No Edema] : no edema [FROM] : FROM [Normal Color/ Pigmentation] : normal color/ pigmentation [No Focal Deficits] : no focal deficits [Oriented x3] : oriented x3 [Normal Affect] : normal affect [III] : Mallampati Class: III [TextBox_2] : overweight  [TextBox_68] : I:E ratio 1:3; clear

## 2024-05-31 ENCOUNTER — LABORATORY RESULT (OUTPATIENT)
Age: 29
End: 2024-05-31

## 2024-05-31 LAB
A1AT SERPL-MCNC: 153 MG/DL
BASOPHILS # BLD AUTO: 0.06 K/UL
BASOPHILS NFR BLD AUTO: 0.7 %
EOSINOPHIL # BLD AUTO: 0.56 K/UL
EOSINOPHIL NFR BLD AUTO: 6.3 %
HCT VFR BLD CALC: 39.7 %
HGB BLD-MCNC: 12.1 G/DL
IMM GRANULOCYTES NFR BLD AUTO: 0.3 %
LYMPHOCYTES # BLD AUTO: 3.17 K/UL
LYMPHOCYTES NFR BLD AUTO: 35.7 %
MAN DIFF?: NORMAL
MCHC RBC-ENTMCNC: 23.2 PG
MCHC RBC-ENTMCNC: 30.5 GM/DL
MCV RBC AUTO: 76.1 FL
MONOCYTES # BLD AUTO: 0.43 K/UL
MONOCYTES NFR BLD AUTO: 4.8 %
NEUTROPHILS # BLD AUTO: 4.63 K/UL
NEUTROPHILS NFR BLD AUTO: 52.2 %
PLATELET # BLD AUTO: 334 K/UL
RBC # BLD: 5.22 M/UL
RBC # FLD: 16.3 %
WBC # FLD AUTO: 8.88 K/UL

## 2024-06-01 LAB
24R-OH-CALCIDIOL SERPL-MCNC: 82.1 PG/ML
25(OH)D3 SERPL-MCNC: 21.4 NG/ML

## 2024-06-03 LAB
A ALTERNATA IGE QN: <0.1 KUA/L
A FUMIGATUS IGE QN: <0.1 KUA/L
ALMOND IGE QN: 9.66 KUA/L
BRAZIL NUT IGE QN: <0.1 KUA/L
C ALBICANS IGE QN: <0.1 KUA/L
C HERBARUM IGE QN: <0.1 KUA/L
CASHEW NUT IGE QN: <0.1 KUA/L
CAT DANDER IGE QN: 3.62 KUA/L
CODFISH IGE QN: <0.1 KUA/L
COMMON RAGWEED IGE QN: 0.45 KUA/L
COW MILK IGE QN: 0.25 KUA/L
D FARINAE IGE QN: <0.1 KUA/L
D PTERONYSS IGE QN: 0.13 KUA/L
DEPRECATED A ALTERNATA IGE RAST QL: 0 (ref 0–?)
DEPRECATED A FUMIGATUS IGE RAST QL: 0 (ref 0–?)
DEPRECATED ALMOND IGE RAST QL: 3 (ref 0–?)
DEPRECATED BRAZIL NUT IGE RAST QL: 0 (ref 0–?)
DEPRECATED C ALBICANS IGE RAST QL: 0
DEPRECATED C HERBARUM IGE RAST QL: 0 (ref 0–?)
DEPRECATED CASHEW NUT IGE RAST QL: 0 (ref 0–?)
DEPRECATED CAT DANDER IGE RAST QL: 3 (ref 0–?)
DEPRECATED CODFISH IGE RAST QL: 0 (ref 0–?)
DEPRECATED COMMON RAGWEED IGE RAST QL: 1 (ref 0–?)
DEPRECATED COW MILK IGE RAST QL: NORMAL (ref 0–?)
DEPRECATED D FARINAE IGE RAST QL: 0 (ref 0–?)
DEPRECATED D PTERONYSS IGE RAST QL: NORMAL (ref 0–?)
DEPRECATED DOG DANDER IGE RAST QL: 1 (ref 0–?)
DEPRECATED DUCK FEATHER IGE RAST QL: 0
DEPRECATED EGG WHITE IGE RAST QL: 0 (ref 0–?)
DEPRECATED GOOSE FEATHER IGE RAST QL: 0
DEPRECATED HAZELNUT IGE RAST QL: 5 (ref 0–?)
DEPRECATED M RACEMOSUS IGE RAST QL: 0
DEPRECATED PEANUT IGE RAST QL: 2 (ref 0–?)
DEPRECATED ROACH IGE RAST QL: 0 (ref 0–?)
DEPRECATED SALMON IGE RAST QL: 0 (ref 0–?)
DEPRECATED SCALLOP IGE RAST QL: <0.1 KUA/L
DEPRECATED SESAME SEED IGE RAST QL: 2 (ref 0–?)
DEPRECATED SHRIMP IGE RAST QL: 0 (ref 0–?)
DEPRECATED SOYBEAN IGE RAST QL: NORMAL (ref 0–?)
DEPRECATED TIMOTHY IGE RAST QL: 0 (ref 0–?)
DEPRECATED TUNA IGE RAST QL: 0 (ref 0–?)
DEPRECATED WALNUT IGE RAST QL: 1 (ref 0–?)
DEPRECATED WHEAT IGE RAST QL: 1 (ref 0–?)
DEPRECATED WHITE OAK IGE RAST QL: 5 (ref 0–?)
DOG DANDER IGE QN: 0.4 KUA/L
DUCK FEATHER IGE QN: <0.1 KUA/L
EGG WHITE IGE QN: <0.1 KUA/L
GOOSE FEATHER IGE QN: <0.1 KUA/L
HAZELNUT IGE QN: 68.9 KUA/L
M RACEMOSUS IGE QN: <0.1 KUA/L
PEANUT IGE QN: 3.1 KUA/L
ROACH IGE QN: <0.1 KUA/L
SALMON IGE QN: <0.1 KUA/L
SCALLOP IGE QN: 0 (ref 0–?)
SCALLOP IGE QN: <0.1 KUA/L
SESAME SEED IGE QN: 1.58 KUA/L
SOYBEAN IGE QN: 0.32 KUA/L
TIMOTHY IGE QN: <0.1 KUA/L
TOTAL IGE SMQN RAST: 270 KU/L
TUNA IGE QN: <0.1 KUA/L
WALNUT IGE QN: 0.49 KUA/L
WHEAT IGE QN: 0.41 KUA/L
WHITE OAK IGE QN: 98.2 KUA/L

## 2024-06-04 DIAGNOSIS — R89.9 UNSPECIFIED ABNORMAL FINDING IN SPECIMENS FROM OTHER ORGANS, SYSTEMS AND TISSUES: ICD-10-CM

## 2024-06-04 LAB
A1AT PHENOTYP SERPL-IMP: NORMAL
A1AT SERPL-MCNC: 151 MG/DL

## 2024-06-17 LAB
ANNOTATION COMMENT IMP: NORMAL
ELECTRONIC SIGNATURE: NORMAL
SERPINA1 GENE MUT TESTED BLD/T: NORMAL

## 2024-07-10 ENCOUNTER — APPOINTMENT (OUTPATIENT)
Dept: PULMONOLOGY | Facility: CLINIC | Age: 29
End: 2024-07-10
Payer: MEDICAID

## 2024-07-10 VITALS
HEART RATE: 105 BPM | RESPIRATION RATE: 16 BRPM | DIASTOLIC BLOOD PRESSURE: 80 MMHG | TEMPERATURE: 97.6 F | OXYGEN SATURATION: 97 % | WEIGHT: 167 LBS | SYSTOLIC BLOOD PRESSURE: 130 MMHG | BODY MASS INDEX: 35.05 KG/M2 | HEIGHT: 58 IN

## 2024-07-10 DIAGNOSIS — J30.2 OTHER SEASONAL ALLERGIC RHINITIS: ICD-10-CM

## 2024-07-10 DIAGNOSIS — J45.30 MILD PERSISTENT ASTHMA, UNCOMPLICATED: ICD-10-CM

## 2024-07-10 DIAGNOSIS — R06.02 SHORTNESS OF BREATH: ICD-10-CM

## 2024-07-10 DIAGNOSIS — G47.33 OBSTRUCTIVE SLEEP APNEA (ADULT) (PEDIATRIC): ICD-10-CM

## 2024-07-10 PROCEDURE — 95012 NITRIC OXIDE EXP GAS DETER: CPT

## 2024-07-10 PROCEDURE — 94010 BREATHING CAPACITY TEST: CPT

## 2024-07-10 PROCEDURE — 99214 OFFICE O/P EST MOD 30 MIN: CPT | Mod: 25

## 2024-12-26 ENCOUNTER — EMERGENCY (EMERGENCY)
Facility: HOSPITAL | Age: 29
LOS: 1 days | Discharge: HOME | End: 2024-12-26
Attending: EMERGENCY MEDICINE | Admitting: EMERGENCY MEDICINE
Payer: MEDICAID

## 2024-12-26 VITALS
HEART RATE: 88 BPM | OXYGEN SATURATION: 100 % | HEIGHT: 58 IN | SYSTOLIC BLOOD PRESSURE: 129 MMHG | WEIGHT: 169.98 LBS | TEMPERATURE: 98 F | RESPIRATION RATE: 16 BRPM | DIASTOLIC BLOOD PRESSURE: 85 MMHG

## 2024-12-26 PROCEDURE — 99285 EMERGENCY DEPT VISIT HI MDM: CPT

## 2024-12-26 RX ORDER — SODIUM CHLORIDE 9 MG/ML
1000 INJECTION, SOLUTION INTRAMUSCULAR; INTRAVENOUS; SUBCUTANEOUS ONCE
Refills: 0 | Status: COMPLETED | OUTPATIENT
Start: 2024-12-26 | End: 2024-12-26

## 2024-12-26 RX ADMIN — SODIUM CHLORIDE 1000 MILLILITER(S): 9 INJECTION, SOLUTION INTRAMUSCULAR; INTRAVENOUS; SUBCUTANEOUS at 23:30

## 2024-12-26 NOTE — ED PROVIDER NOTE - OBJECTIVE STATEMENT
30 y/o female with h/o pcos lmp 11/6 here with c/o rlq pain and diarrhea x5 days.  No blood or mucus in stool.  Approx 5 episodes of diarrhea daily.  Taking tylenol and motrin without relief.  Last doses yesterday.  Went to [facility] and sent to ED for ct.

## 2024-12-26 NOTE — ED PROVIDER NOTE - PATIENT PORTAL LINK FT
You can access the FollowMyHealth Patient Portal offered by [facility] by registering at the following website: [email or web address]. By joining [facility]’s FollowMyHealth portal, you will also be able to view your health information using other applications (apps) compatible with our system.

## 2024-12-26 NOTE — ED ADULT TRIAGE NOTE - CHIEF COMPLAINT QUOTE
c/o RLQ abdominal pain and diarrhea  x5 days. LMP 11/6. Denies n/v, fevers chills, urinary sx. History of PCOS

## 2024-12-26 NOTE — ED PROVIDER NOTE - CLINICAL SUMMARY MEDICAL DECISION MAKING FREE TEXT BOX
30 y/o female with c/o rlq pain diarrhea x5 days.  Evaluate for appy diverticulitis pregnancy uti colitis.  Obtain cbc cmp hcg ua ucx ct a/p give ivf bolus pain med reassess.

## 2024-12-27 VITALS
OXYGEN SATURATION: 100 % | DIASTOLIC BLOOD PRESSURE: 72 MMHG | RESPIRATION RATE: 17 BRPM | TEMPERATURE: 98 F | HEART RATE: 81 BPM | SYSTOLIC BLOOD PRESSURE: 107 MMHG

## 2024-12-27 LAB
ALBUMIN SERPL ELPH-MCNC: 4 G/DL — SIGNIFICANT CHANGE UP (ref 3.3–5)
ALP SERPL-CCNC: 68 U/L — SIGNIFICANT CHANGE UP (ref 40–120)
ALT FLD-CCNC: 17 U/L — SIGNIFICANT CHANGE UP (ref 4–33)
ANION GAP SERPL CALC-SCNC: 13 MMOL/L — SIGNIFICANT CHANGE UP (ref 7–14)
APPEARANCE UR: CLEAR — SIGNIFICANT CHANGE UP
AST SERPL-CCNC: 18 U/L — SIGNIFICANT CHANGE UP (ref 4–32)
BACTERIA # UR AUTO: NEGATIVE /HPF — SIGNIFICANT CHANGE UP
BASOPHILS # BLD AUTO: 0.07 K/UL — SIGNIFICANT CHANGE UP (ref 0–0.2)
BASOPHILS NFR BLD AUTO: 0.5 % — SIGNIFICANT CHANGE UP (ref 0–2)
BILIRUB SERPL-MCNC: 0.4 MG/DL — SIGNIFICANT CHANGE UP (ref 0.2–1.2)
BILIRUB UR-MCNC: NEGATIVE — SIGNIFICANT CHANGE UP
BUN SERPL-MCNC: 11 MG/DL — SIGNIFICANT CHANGE UP (ref 7–23)
CALCIUM SERPL-MCNC: 9.1 MG/DL — SIGNIFICANT CHANGE UP (ref 8.4–10.5)
CAST: 0 /LPF — SIGNIFICANT CHANGE UP (ref 0–4)
CHLORIDE SERPL-SCNC: 102 MMOL/L — SIGNIFICANT CHANGE UP (ref 98–107)
CO2 SERPL-SCNC: 23 MMOL/L — SIGNIFICANT CHANGE UP (ref 22–31)
COLOR SPEC: YELLOW — SIGNIFICANT CHANGE UP
CREAT SERPL-MCNC: 0.55 MG/DL — SIGNIFICANT CHANGE UP (ref 0.5–1.3)
CULTURE RESULTS: SIGNIFICANT CHANGE UP
DIFF PNL FLD: ABNORMAL
EGFR: 126 ML/MIN/1.73M2 — SIGNIFICANT CHANGE UP
EOSINOPHIL # BLD AUTO: 0.49 K/UL — SIGNIFICANT CHANGE UP (ref 0–0.5)
EOSINOPHIL NFR BLD AUTO: 3.7 % — SIGNIFICANT CHANGE UP (ref 0–6)
GLUCOSE SERPL-MCNC: 90 MG/DL — SIGNIFICANT CHANGE UP (ref 70–99)
GLUCOSE UR QL: NEGATIVE MG/DL — SIGNIFICANT CHANGE UP
HCT VFR BLD CALC: 37.8 % — SIGNIFICANT CHANGE UP (ref 34.5–45)
HGB BLD-MCNC: 11.5 G/DL — SIGNIFICANT CHANGE UP (ref 11.5–15.5)
IANC: 7.76 K/UL — HIGH (ref 1.8–7.4)
IMM GRANULOCYTES NFR BLD AUTO: 0.3 % — SIGNIFICANT CHANGE UP (ref 0–0.9)
KETONES UR-MCNC: NEGATIVE MG/DL — SIGNIFICANT CHANGE UP
LEUKOCYTE ESTERASE UR-ACNC: NEGATIVE — SIGNIFICANT CHANGE UP
LYMPHOCYTES # BLD AUTO: 32.1 % — SIGNIFICANT CHANGE UP (ref 13–44)
LYMPHOCYTES # BLD AUTO: 4.25 K/UL — HIGH (ref 1–3.3)
MCHC RBC-ENTMCNC: 23.2 PG — LOW (ref 27–34)
MCHC RBC-ENTMCNC: 30.4 G/DL — LOW (ref 32–36)
MCV RBC AUTO: 76.4 FL — LOW (ref 80–100)
MONOCYTES # BLD AUTO: 0.62 K/UL — SIGNIFICANT CHANGE UP (ref 0–0.9)
MONOCYTES NFR BLD AUTO: 4.7 % — SIGNIFICANT CHANGE UP (ref 2–14)
NEUTROPHILS # BLD AUTO: 7.76 K/UL — HIGH (ref 1.8–7.4)
NEUTROPHILS NFR BLD AUTO: 58.7 % — SIGNIFICANT CHANGE UP (ref 43–77)
NITRITE UR-MCNC: NEGATIVE — SIGNIFICANT CHANGE UP
NRBC # BLD: 0 /100 WBCS — SIGNIFICANT CHANGE UP (ref 0–0)
NRBC # FLD: 0 K/UL — SIGNIFICANT CHANGE UP (ref 0–0)
PH UR: 5.5 — SIGNIFICANT CHANGE UP (ref 5–8)
PLATELET # BLD AUTO: 400 K/UL — SIGNIFICANT CHANGE UP (ref 150–400)
POTASSIUM SERPL-MCNC: 3.9 MMOL/L — SIGNIFICANT CHANGE UP (ref 3.5–5.3)
POTASSIUM SERPL-SCNC: 3.9 MMOL/L — SIGNIFICANT CHANGE UP (ref 3.5–5.3)
PROT SERPL-MCNC: 7.3 G/DL — SIGNIFICANT CHANGE UP (ref 6–8.3)
PROT UR-MCNC: SIGNIFICANT CHANGE UP MG/DL
RBC # BLD: 4.95 M/UL — SIGNIFICANT CHANGE UP (ref 3.8–5.2)
RBC # FLD: 15.2 % — HIGH (ref 10.3–14.5)
RBC CASTS # UR COMP ASSIST: 13 /HPF — HIGH (ref 0–4)
SODIUM SERPL-SCNC: 138 MMOL/L — SIGNIFICANT CHANGE UP (ref 135–145)
SP GR SPEC: 1.02 — SIGNIFICANT CHANGE UP (ref 1–1.03)
SPECIMEN SOURCE: SIGNIFICANT CHANGE UP
SQUAMOUS # UR AUTO: 2 /HPF — SIGNIFICANT CHANGE UP (ref 0–5)
UROBILINOGEN FLD QL: 0.2 MG/DL — SIGNIFICANT CHANGE UP (ref 0.2–1)
WBC # BLD: 13.23 K/UL — HIGH (ref 3.8–10.5)
WBC # FLD AUTO: 13.23 K/UL — HIGH (ref 3.8–10.5)
WBC UR QL: 2 /HPF — SIGNIFICANT CHANGE UP (ref 0–5)

## 2024-12-27 PROCEDURE — 74177 CT ABD & PELVIS W/CONTRAST: CPT | Mod: 26,MC

## 2024-12-27 RX ADMIN — Medication 4 MILLIGRAM(S): at 00:56

## 2024-12-27 RX ADMIN — SODIUM CHLORIDE 1000 MILLILITER(S): 9 INJECTION, SOLUTION INTRAMUSCULAR; INTRAVENOUS; SUBCUTANEOUS at 02:29

## 2024-12-27 NOTE — ED ADULT NURSE NOTE - OBJECTIVE STATEMENT
Pt aaox4, c/o RLQ abdominal pain with diarrhea x 5 days. Went to urgent care and recommended to go to Er for possible appendicitis. Denies any pmh. Breathing is equal and unlabored on room air. 20g anju placed to left AC, labs drawn and sent. Pending dispo.

## 2025-01-10 ENCOUNTER — APPOINTMENT (OUTPATIENT)
Dept: PULMONOLOGY | Facility: CLINIC | Age: 30
End: 2025-01-10
Payer: MEDICAID

## 2025-01-10 VITALS
HEIGHT: 58 IN | BODY MASS INDEX: 36.31 KG/M2 | RESPIRATION RATE: 16 BRPM | OXYGEN SATURATION: 98 % | SYSTOLIC BLOOD PRESSURE: 134 MMHG | HEART RATE: 95 BPM | DIASTOLIC BLOOD PRESSURE: 86 MMHG | WEIGHT: 173 LBS | TEMPERATURE: 97.4 F

## 2025-01-10 DIAGNOSIS — J82.83 EOSINOPHILIC ASTHMA: ICD-10-CM

## 2025-01-10 DIAGNOSIS — G47.33 OBSTRUCTIVE SLEEP APNEA (ADULT) (PEDIATRIC): ICD-10-CM

## 2025-01-10 DIAGNOSIS — R76.8 OTHER SPECIFIED ABNORMAL IMMUNOLOGICAL FINDINGS IN SERUM: ICD-10-CM

## 2025-01-10 DIAGNOSIS — J30.2 OTHER SEASONAL ALLERGIC RHINITIS: ICD-10-CM

## 2025-01-10 DIAGNOSIS — R94.2 ABNORMAL RESULTS OF PULMONARY FUNCTION STUDIES: ICD-10-CM

## 2025-01-10 DIAGNOSIS — E66.3 OVERWEIGHT: ICD-10-CM

## 2025-01-10 DIAGNOSIS — R06.02 SHORTNESS OF BREATH: ICD-10-CM

## 2025-01-10 DIAGNOSIS — J45.30 MILD PERSISTENT ASTHMA, UNCOMPLICATED: ICD-10-CM

## 2025-01-10 PROCEDURE — 99214 OFFICE O/P EST MOD 30 MIN: CPT | Mod: 25

## 2025-01-10 PROCEDURE — ZZZZZ: CPT

## 2025-01-10 PROCEDURE — 94010 BREATHING CAPACITY TEST: CPT

## 2025-01-10 PROCEDURE — 94729 DIFFUSING CAPACITY: CPT

## 2025-01-10 PROCEDURE — 95012 NITRIC OXIDE EXP GAS DETER: CPT

## 2025-01-10 PROCEDURE — 94727 GAS DIL/WSHOT DETER LNG VOL: CPT

## 2025-05-07 ENCOUNTER — NON-APPOINTMENT (OUTPATIENT)
Age: 30
End: 2025-05-07

## 2025-05-08 ENCOUNTER — APPOINTMENT (OUTPATIENT)
Dept: PULMONOLOGY | Facility: CLINIC | Age: 30
End: 2025-05-08
Payer: MEDICAID

## 2025-05-08 VITALS
RESPIRATION RATE: 16 BRPM | WEIGHT: 180 LBS | SYSTOLIC BLOOD PRESSURE: 112 MMHG | OXYGEN SATURATION: 98 % | TEMPERATURE: 97.2 F | BODY MASS INDEX: 37.79 KG/M2 | DIASTOLIC BLOOD PRESSURE: 62 MMHG | HEART RATE: 91 BPM | HEIGHT: 58 IN

## 2025-05-08 DIAGNOSIS — R76.8 OTHER SPECIFIED ABNORMAL IMMUNOLOGICAL FINDINGS IN SERUM: ICD-10-CM

## 2025-05-08 DIAGNOSIS — J82.83 EOSINOPHILIC ASTHMA: ICD-10-CM

## 2025-05-08 DIAGNOSIS — R06.83 SNORING: ICD-10-CM

## 2025-05-08 DIAGNOSIS — G47.33 OBSTRUCTIVE SLEEP APNEA (ADULT) (PEDIATRIC): ICD-10-CM

## 2025-05-08 DIAGNOSIS — E66.3 OVERWEIGHT: ICD-10-CM

## 2025-05-08 DIAGNOSIS — J30.2 OTHER SEASONAL ALLERGIC RHINITIS: ICD-10-CM

## 2025-05-08 DIAGNOSIS — J45.30 MILD PERSISTENT ASTHMA, UNCOMPLICATED: ICD-10-CM

## 2025-05-08 DIAGNOSIS — R06.02 SHORTNESS OF BREATH: ICD-10-CM

## 2025-05-08 PROCEDURE — 95012 NITRIC OXIDE EXP GAS DETER: CPT

## 2025-05-08 PROCEDURE — 94010 BREATHING CAPACITY TEST: CPT

## 2025-05-08 PROCEDURE — 99215 OFFICE O/P EST HI 40 MIN: CPT | Mod: 25

## 2025-05-08 RX ORDER — DESLORATADINE 5 MG/1
5 TABLET ORAL
Qty: 30 | Refills: 3 | Status: ACTIVE | COMMUNITY
Start: 2025-05-08 | End: 1900-01-01

## 2025-05-22 ENCOUNTER — TRANSCRIPTION ENCOUNTER (OUTPATIENT)
Age: 30
End: 2025-05-22